# Patient Record
Sex: FEMALE | Race: BLACK OR AFRICAN AMERICAN | NOT HISPANIC OR LATINO | URBAN - METROPOLITAN AREA
[De-identification: names, ages, dates, MRNs, and addresses within clinical notes are randomized per-mention and may not be internally consistent; named-entity substitution may affect disease eponyms.]

---

## 2020-07-16 ENCOUNTER — EMERGENCY (EMERGENCY)
Facility: HOSPITAL | Age: 25
LOS: 0 days | Discharge: HOME | End: 2020-07-16
Attending: EMERGENCY MEDICINE
Payer: COMMERCIAL

## 2020-07-16 VITALS
OXYGEN SATURATION: 98 % | TEMPERATURE: 99 F | DIASTOLIC BLOOD PRESSURE: 61 MMHG | SYSTOLIC BLOOD PRESSURE: 104 MMHG | RESPIRATION RATE: 17 BRPM | HEART RATE: 64 BPM

## 2020-07-16 VITALS — HEIGHT: 67 IN | WEIGHT: 162.92 LBS

## 2020-07-16 DIAGNOSIS — R10.30 LOWER ABDOMINAL PAIN, UNSPECIFIED: ICD-10-CM

## 2020-07-16 DIAGNOSIS — R55 SYNCOPE AND COLLAPSE: ICD-10-CM

## 2020-07-16 DIAGNOSIS — N70.11 CHRONIC SALPINGITIS: ICD-10-CM

## 2020-07-16 LAB
ALBUMIN SERPL ELPH-MCNC: 3.9 G/DL — SIGNIFICANT CHANGE UP (ref 3.3–5)
ALP SERPL-CCNC: 62 U/L — SIGNIFICANT CHANGE UP (ref 40–120)
ALT FLD-CCNC: 18 U/L — SIGNIFICANT CHANGE UP (ref 12–78)
ANION GAP SERPL CALC-SCNC: 6 MMOL/L — SIGNIFICANT CHANGE UP (ref 5–17)
APPEARANCE UR: CLEAR — SIGNIFICANT CHANGE UP
AST SERPL-CCNC: 9 U/L — LOW (ref 15–37)
BACTERIA # UR AUTO: NEGATIVE — SIGNIFICANT CHANGE UP
BASOPHILS # BLD AUTO: 0.05 K/UL — SIGNIFICANT CHANGE UP (ref 0–0.2)
BASOPHILS NFR BLD AUTO: 0.6 % — SIGNIFICANT CHANGE UP (ref 0–2)
BILIRUB SERPL-MCNC: 1.2 MG/DL — SIGNIFICANT CHANGE UP (ref 0.2–1.2)
BILIRUB UR-MCNC: NEGATIVE — SIGNIFICANT CHANGE UP
BUN SERPL-MCNC: 10 MG/DL — SIGNIFICANT CHANGE UP (ref 7–23)
CALCIUM SERPL-MCNC: 8.7 MG/DL — SIGNIFICANT CHANGE UP (ref 8.5–10.1)
CHLORIDE SERPL-SCNC: 107 MMOL/L — SIGNIFICANT CHANGE UP (ref 96–108)
CO2 SERPL-SCNC: 26 MMOL/L — SIGNIFICANT CHANGE UP (ref 22–31)
COLOR SPEC: YELLOW — SIGNIFICANT CHANGE UP
COMMENT - URINE: SIGNIFICANT CHANGE UP
CREAT SERPL-MCNC: 0.77 MG/DL — SIGNIFICANT CHANGE UP (ref 0.5–1.3)
DIFF PNL FLD: ABNORMAL
EOSINOPHIL # BLD AUTO: 0.09 K/UL — SIGNIFICANT CHANGE UP (ref 0–0.5)
EOSINOPHIL NFR BLD AUTO: 1.1 % — SIGNIFICANT CHANGE UP (ref 0–6)
EPI CELLS # UR: SIGNIFICANT CHANGE UP
GLUCOSE SERPL-MCNC: 80 MG/DL — SIGNIFICANT CHANGE UP (ref 70–99)
GLUCOSE UR QL: NEGATIVE MG/DL — SIGNIFICANT CHANGE UP
HCT VFR BLD CALC: 38.3 % — SIGNIFICANT CHANGE UP (ref 34.5–45)
HGB BLD-MCNC: 12.7 G/DL — SIGNIFICANT CHANGE UP (ref 11.5–15.5)
IMM GRANULOCYTES NFR BLD AUTO: 0.4 % — SIGNIFICANT CHANGE UP (ref 0–1.5)
KETONES UR-MCNC: ABNORMAL
LEUKOCYTE ESTERASE UR-ACNC: NEGATIVE — SIGNIFICANT CHANGE UP
LYMPHOCYTES # BLD AUTO: 1.79 K/UL — SIGNIFICANT CHANGE UP (ref 1–3.3)
LYMPHOCYTES # BLD AUTO: 21.1 % — SIGNIFICANT CHANGE UP (ref 13–44)
MCHC RBC-ENTMCNC: 31.4 PG — SIGNIFICANT CHANGE UP (ref 27–34)
MCHC RBC-ENTMCNC: 33.2 GM/DL — SIGNIFICANT CHANGE UP (ref 32–36)
MCV RBC AUTO: 94.6 FL — SIGNIFICANT CHANGE UP (ref 80–100)
MONOCYTES # BLD AUTO: 0.49 K/UL — SIGNIFICANT CHANGE UP (ref 0–0.9)
MONOCYTES NFR BLD AUTO: 5.8 % — SIGNIFICANT CHANGE UP (ref 2–14)
NEUTROPHILS # BLD AUTO: 6.04 K/UL — SIGNIFICANT CHANGE UP (ref 1.8–7.4)
NEUTROPHILS NFR BLD AUTO: 71 % — SIGNIFICANT CHANGE UP (ref 43–77)
NITRITE UR-MCNC: NEGATIVE — SIGNIFICANT CHANGE UP
PH UR: 5 — SIGNIFICANT CHANGE UP (ref 5–8)
PLATELET # BLD AUTO: 281 K/UL — SIGNIFICANT CHANGE UP (ref 150–400)
POTASSIUM SERPL-MCNC: 4 MMOL/L — SIGNIFICANT CHANGE UP (ref 3.5–5.3)
POTASSIUM SERPL-SCNC: 4 MMOL/L — SIGNIFICANT CHANGE UP (ref 3.5–5.3)
PROT SERPL-MCNC: 7.2 GM/DL — SIGNIFICANT CHANGE UP (ref 6–8.3)
PROT UR-MCNC: 15 MG/DL
RBC # BLD: 4.05 M/UL — SIGNIFICANT CHANGE UP (ref 3.8–5.2)
RBC # FLD: 11.5 % — SIGNIFICANT CHANGE UP (ref 10.3–14.5)
RBC CASTS # UR COMP ASSIST: ABNORMAL /HPF (ref 0–4)
SODIUM SERPL-SCNC: 139 MMOL/L — SIGNIFICANT CHANGE UP (ref 135–145)
SP GR SPEC: 1.02 — SIGNIFICANT CHANGE UP (ref 1.01–1.02)
UROBILINOGEN FLD QL: NEGATIVE MG/DL — SIGNIFICANT CHANGE UP
WBC # BLD: 8.49 K/UL — SIGNIFICANT CHANGE UP (ref 3.8–10.5)
WBC # FLD AUTO: 8.49 K/UL — SIGNIFICANT CHANGE UP (ref 3.8–10.5)
WBC UR QL: SIGNIFICANT CHANGE UP

## 2020-07-16 PROCEDURE — 96374 THER/PROPH/DIAG INJ IV PUSH: CPT

## 2020-07-16 PROCEDURE — 99284 EMERGENCY DEPT VISIT MOD MDM: CPT

## 2020-07-16 PROCEDURE — 93010 ELECTROCARDIOGRAM REPORT: CPT

## 2020-07-16 PROCEDURE — 85025 COMPLETE CBC W/AUTO DIFF WBC: CPT

## 2020-07-16 PROCEDURE — 76856 US EXAM PELVIC COMPLETE: CPT | Mod: 26

## 2020-07-16 PROCEDURE — 80053 COMPREHEN METABOLIC PANEL: CPT

## 2020-07-16 PROCEDURE — 99284 EMERGENCY DEPT VISIT MOD MDM: CPT | Mod: 25

## 2020-07-16 PROCEDURE — 81001 URINALYSIS AUTO W/SCOPE: CPT

## 2020-07-16 PROCEDURE — 36415 COLL VENOUS BLD VENIPUNCTURE: CPT

## 2020-07-16 PROCEDURE — 81025 URINE PREGNANCY TEST: CPT

## 2020-07-16 PROCEDURE — 93005 ELECTROCARDIOGRAM TRACING: CPT

## 2020-07-16 PROCEDURE — 76856 US EXAM PELVIC COMPLETE: CPT

## 2020-07-16 RX ORDER — KETOROLAC TROMETHAMINE 30 MG/ML
15 SYRINGE (ML) INJECTION ONCE
Refills: 0 | Status: DISCONTINUED | OUTPATIENT
Start: 2020-07-16 | End: 2020-07-16

## 2020-07-16 RX ORDER — SODIUM CHLORIDE 9 MG/ML
1000 INJECTION INTRAMUSCULAR; INTRAVENOUS; SUBCUTANEOUS ONCE
Refills: 0 | Status: COMPLETED | OUTPATIENT
Start: 2020-07-16 | End: 2020-07-16

## 2020-07-16 RX ADMIN — Medication 15 MILLIGRAM(S): at 13:06

## 2020-07-16 RX ADMIN — SODIUM CHLORIDE 1000 MILLILITER(S): 9 INJECTION INTRAMUSCULAR; INTRAVENOUS; SUBCUTANEOUS at 10:27

## 2020-07-16 NOTE — ED ADULT NURSE REASSESSMENT NOTE - NS ED NURSE REASSESS COMMENT FT1
Received care of patient from Maria L LOZA patient using restroom, will place IV and draw blood work as ordered

## 2020-07-16 NOTE — ED PROVIDER NOTE - PATIENT PORTAL LINK FT
You can access the FollowMyHealth Patient Portal offered by [facility] by registering at the following website: [email or web address]. By joining [employer]’s FollowMyHealth portal, you will also be able to view your health information using other applications (apps) compatible with our system.

## 2020-07-16 NOTE — ED PROVIDER NOTE - NSFOLLOWUPINSTRUCTIONS_ED_ALL_ED_FT
Syncope    WHAT YOU NEED TO KNOW:    Syncope is also called fainting or passing out. Syncope is a sudden, temporary loss of consciousness, followed by a fall from a standing or sitting position. Syncope ranges from not serious to a sign of a more serious condition that needs to be treated. You can control some health conditions that cause syncope. Your healthcare providers can help you create a plan to manage syncope and prevent episodes.    DISCHARGE INSTRUCTIONS:    Seek care immediately if:     You are bleeding because you hit your head when you fainted.       You suddenly have double vision, difficulty speaking, numbness, and cannot move your arms or legs.      You have chest pain and trouble breathing.      You vomit blood or material that looks like coffee grounds.      You see blood in your bowel movement.    Contact your healthcare provider if:     You have new or worsening symptoms.      You have another syncope episode.      You have a headache, fast heartbeat, or feel too dizzy to stand up.      You have questions or concerns about your condition or care.    Medicines:     Medicines may be needed to help your heart pump strongly and regularly. Your healthcare provider may also make changes to any medicines that are causing syncope.       Take your medicine as directed. Contact your healthcare provider if you think your medicine is not helping or if you have side effects. Tell him or her if you are allergic to any medicine. Keep a list of the medicines, vitamins, and herbs you take. Include the amounts, and when and why you take them. Bring the list or the pill bottles to follow-up visits. Carry your medicine list with you in case of an emergency.    Follow up with your healthcare provider as directed: Write down your questions so you remember to ask them during your visits.     Manage syncope:     Keep a record of your syncope episodes. Include your symptoms and your activity before and after the episode. The record can help your healthcare provider find the cause of your syncope and help you manage episodes.      Sit or lie down when needed. This includes when you feel dizzy, your throat is getting tight, and your vision changes. Raise your legs above the level of your heart.      Take slow, deep breaths if you start to breathe faster with anxiety or fear. This can help decrease dizziness and the feeling that you might faint.       Check your blood pressure often. This is important if you take medicine to lower your blood pressure. Check your blood pressure when you are lying down and when you are standing. Ask how often to check during the day. Keep a record of your blood pressure numbers. Your healthcare provider may use the record to help plan your treatment.How to take a Blood Pressure         Prevent a syncope episode:     Move slowly and let yourself get used to one position before you move to another position. This is very important when you change from a lying or sitting position to a standing position. Take some deep breaths before you stand up from a lying position. Stand up slowly. Sudden movements may cause a fainting spell. Sit on the side of the bed or couch for a few minutes before you stand up. If you are on bedrest, try to be upright for about 2 hours each day, or as directed. Do not lock your legs if you are standing for a long period of time. Move your legs and bend your knees to keep blood flowing.      Follow your healthcare provider's recommendations. Your provider may recommend that you drink more liquids to prevent dehydration. You may also need to have more salt to keep your blood pressure from dropping too low and causing syncope. Your provider will tell you how much liquid and sodium to have each day. He or she will also tell you how much physical activity is safe for you. This will depend on what is causing your syncope.      Watch for signs of low blood sugar. These include hunger, nervousness, sweating, and fast or fluttery heartbeats. Talk with your healthcare provider about ways to keep your blood sugar level steady.      Do not strain if you are constipated. You may faint if you strain to have a bowel movement. Walking is the best way to get your bowels moving. Eat foods high in fiber to make it easier to have a bowel movement. Good examples are high-fiber cereals, beans, vegetables, and whole-grain breads. Prune juice may help make bowel movements softer.      Be careful in hot weather. Heat can cause a syncope episode. Limit activity done outside on hot days. Physical activity in hot weather can lead to dehydration. This can cause an episode.  Acute Abdominal Pain    WHAT YOU NEED TO KNOW:    The cause of your abdominal pain may not be found. If a cause is found, treatment will depend on what the cause is.     DISCHARGE INSTRUCTIONS:    Return to the emergency department if:     You vomit blood or cannot stop vomiting.      You have blood in your bowel movement or it looks like tar.       You have bleeding from your rectum.       Your abdomen is larger than usual, more painful, and hard.       You have severe pain in your abdomen.       You stop passing gas and having bowel movements.       You feel weak, dizzy, or faint.    Contact your healthcare provider if:     You have a fever.      You have new signs and symptoms.      Your symptoms do not get better with treatment.       You have questions or concerns about your condition or care.    Medicines may be given to decrease pain, treat an infection, and manage your symptoms. Take your medicine as directed. Call your healthcare provider if you think your medicine is not helping or if you have side effects. Tell him if you are allergic to any medicine. Keep a list of the medicines, vitamins, and herbs you take. Include the amounts, and when and why you take them. Bring the list or the pill bottles to follow-up visits. Carry your medicine list with you in case of an emergency.    Manage your symptoms:     Apply heat on your abdomen for 20 to 30 minutes every 2 hours for as many days as directed. Heat helps decrease pain and muscle spasms.       Manage your stress. Stress may cause abdominal pain. Your healthcare provider may recommend relaxation techniques and deep breathing exercises to help decrease your stress. Your healthcare provider may recommend you talk to someone about your stress or anxiety, such as a counselor or a trusted friend. Get plenty of sleep and exercise regularly.       Limit or do not drink alcohol. Alcohol can make your abdominal pain worse. Ask your healthcare provider if it is safe for you to drink alcohol. Also ask how much is safe for you to drink.       Do not smoke. Nicotine and other chemicals in cigarettes can damage your esophagus and stomach. Ask your healthcare provider for information if you currently smoke and need help to quit. E-cigarettes or smokeless tobacco still contain nicotine. Talk to your healthcare provider before you use these products.     Make changes to the food you eat as directed: Do not eat foods that cause abdominal pain or other symptoms. Eat small meals more often.     Eat more high-fiber foods if you are constipated. High-fiber foods include fruits, vegetables, whole-grain foods, and legumes.       Do not eat foods that cause gas if you have bloating. Examples include broccoli, cabbage, and cauliflower. Do not drink soda or carbonated drinks, because these may also cause gas.       Do not eat foods or drinks that contain sorbitol or fructose if you have diarrhea and bloating. Some examples are fruit juices, candy, jelly, and sugar-free gum.       Do not eat high-fat foods, such as fried foods, cheeseburgers, hot dogs, and desserts.      Limit or do not drink caffeine. Caffeine may make symptoms, such as heart burn or nausea, worse.       Drink plenty of liquids to prevent dehydration from diarrhea or vomiting. Ask your healthcare provider how much liquid to drink each day and which liquids are best for you.     Follow up with your healthcare provider as directed: Write down your questions so you remember to ask them during your visits.

## 2020-07-16 NOTE — ED PROVIDER NOTE - MUSCULOSKELETAL, MLM
Spine appears normal, range of motion is not limited, no muscle or joint tenderness. +mild tend diffusely to lower pelvic region.

## 2020-07-16 NOTE — ED PROVIDER NOTE - CLINICAL SUMMARY MEDICAL DECISION MAKING FREE TEXT BOX
will check labs, no red flags for syncope. will check pelvic ultrasound for cysts dispo. pending labs. will check labs, no red flags for syncope. will check pelvic ultrasound for cysts dispo pending labs.

## 2020-07-16 NOTE — CONSULT NOTE ADULT - ASSESSMENT
A/P  Possible muscular back pain in combination to R hydrosalpinx cause of pain  Patient advised to follow-up outpatient   No further intervention necessary at present time from OBGYN prospective  Patient encouraged to continue current  pain management regimen

## 2020-07-16 NOTE — CONSULT NOTE ADULT - SUBJECTIVE AND OBJECTIVE BOX
LATESHA BEAVERS  25y  Female [id number]    HPI: Patient is a 25 y.o  Female presenting to ED for a syncopal event while defecating diarrhea and on her menstrual period. Patient reports to heavy cramping during menstruation but feels this pain is different, referring to back pain. Pain improves with Advil. Patient reports lack of medical care due to fear of doctors. Denies PMHx including STIs but claims psych hx of bipolar and depression.     REVIEW OF SYSTEMS:  CONSTITUTIONAL: No weakness, fevers or chills  EYES/ENT: No visual changes;  No vertigo or throat pain   NECK: No pain or stiffness  RESPIRATORY: No cough, wheezing, hemoptysis; No shortness of breath  CARDIOVASCULAR: No chest pain or palpitations  GASTROINTESTINAL: No abdominal or epigastric pain. No nausea, vomiting, or hematemesis; No diarrhea or constipation. No melena or hematochezia.  GENITOURINARY: No dysuria, frequency or hematuria  NEUROLOGICAL: No numbness or weakness  SKIN: No itching, burning, rashes, or lesions   All other review of systems is negative unless indicated above      No Known Allergies      PAST MEDICAL & SURGICAL HISTORY: Denies                                          Vital Signs Last 24 Hrs  T(C): 37.2 (2020 09:44), Max: 37.2 (2020 09:44)  T(F): 99 (2020 09:44), Max: 99 (2020 09:44)  HR: 63 (2020 09:44) (63 - 63)  BP: 98/67 (2020 09:44) (98/67 - 98/67)  BP(mean): --  RR: 18 (2020 09:44) (18 - 18)  SpO2: 95% (2020 09:44) (95% - 95%)    Last Menstrual Period x 2 days      PHYSICAL EXAM:  Constitutional: NAD, awake and alert, well-developed  Gastrointestinal:  tender to palpation, Bowel Sounds present, soft,, nondistended, no guarding, no rebound      LABS:  Pregnancy Test:                        12.7   8.49  )-----------( 281      ( 2020 10:00 )             38.3     07-16    139  |  107  |  10  ----------------------------<  80  4.0   |  26  |  0.77    Ca    8.7      2020 10:00    TPro  7.2  /  Alb  3.9  /  TBili  1.2  /  DBili  x   /  AST  9<L>  /  ALT  18  /  AlkPhos  62  07-16    I&O's Detail      Urinalysis Basic - ( 2020 10:14 )    Color: Yellow / Appearance: Clear / S.020 / pH: x  Gluc: x / Ketone: Moderate  / Bili: Negative / Urobili: Negative mg/dL   Blood: x / Protein: 15 mg/dL / Nitrite: Negative   Leuk Esterase: Negative / RBC: 6-10 /HPF / WBC 0-2   Sq Epi: x / Non Sq Epi: Occasional / Bacteria: Negative        RADIOLOGY & ADDITIONAL STUDIES:

## 2020-07-16 NOTE — ED ADULT TRIAGE NOTE - CHIEF COMPLAINT QUOTE
pt ambulatory to ED for eval of near syncope last night while having bowel movement w/ increasing ABD pain. pt notes diarrhea and menstrual cycle.

## 2020-07-16 NOTE — ED PROVIDER NOTE - PROGRESS NOTE DETAILS
US findings noted. Discussed with ob/gyn who evaluated patient.  Well appearing and not sexually active since January.  Not suspicious for PID.  Pt well appearing.  No e/o abd pain on exam, appears to be more pelvic.  Discussed remote possibility of appy.  Pt agrees with plan for watchful waiting.  Well appearing on reassessment.  No e/o arrythmia.  Story not suspicious for dangerous etiology of syncope.  Stable for outpatient f/u.  D/c home with strict return precautions and prompt outpatient f/u.

## 2020-07-16 NOTE — ED PROVIDER NOTE - CARE PROVIDER_API CALL
Suraj Gomez)  Obstetrics and Gynecology  [address] [place] [address]  Phone: [phone]  Fax: [phone]  Follow Up Time: 1-3 Days

## 2020-07-16 NOTE — ED PROVIDER NOTE - OBJECTIVE STATEMENT
26 y/o female with no significant pmhx presents to the ED c/o syncope. Pt came to the ED for eval due to a syncopal episode while having a BM with increasing abd pain last night. Pt notes that she is currently was on the toilet having menstrual bleeding. Pt states she started feeling light headed. lowered her self to the floor. Believes she was out for about 1 min. She is having complaints of pelvic pain, did just start her period but states its worse than previous. other than pelvis pain, she has no other complaints at this time.

## 2020-07-27 ENCOUNTER — INPATIENT (INPATIENT)
Facility: HOSPITAL | Age: 25
LOS: 7 days | Discharge: HOME | DRG: 885 | End: 2020-08-04
Attending: PSYCHIATRY & NEUROLOGY | Admitting: PSYCHIATRY & NEUROLOGY
Payer: COMMERCIAL

## 2020-07-27 VITALS — WEIGHT: 160.94 LBS | HEIGHT: 67 IN

## 2020-07-27 DIAGNOSIS — F31.62 BIPOLAR DISORDER, CURRENT EPISODE MIXED, MODERATE: ICD-10-CM

## 2020-07-27 DIAGNOSIS — F12.90 CANNABIS USE, UNSPECIFIED, UNCOMPLICATED: ICD-10-CM

## 2020-07-27 DIAGNOSIS — F41.1 GENERALIZED ANXIETY DISORDER: ICD-10-CM

## 2020-07-27 LAB
ALBUMIN SERPL ELPH-MCNC: 3.6 G/DL — SIGNIFICANT CHANGE UP (ref 3.3–5)
ALP SERPL-CCNC: 58 U/L — SIGNIFICANT CHANGE UP (ref 40–120)
ALT FLD-CCNC: 19 U/L — SIGNIFICANT CHANGE UP (ref 12–78)
ANION GAP SERPL CALC-SCNC: 5 MMOL/L — SIGNIFICANT CHANGE UP (ref 5–17)
APAP SERPL-MCNC: < 2 UG/ML (ref 10–30)
APPEARANCE UR: ABNORMAL
AST SERPL-CCNC: 9 U/L — LOW (ref 15–37)
BASOPHILS # BLD AUTO: 0.08 K/UL — SIGNIFICANT CHANGE UP (ref 0–0.2)
BASOPHILS NFR BLD AUTO: 1.2 % — SIGNIFICANT CHANGE UP (ref 0–2)
BILIRUB SERPL-MCNC: 0.7 MG/DL — SIGNIFICANT CHANGE UP (ref 0.2–1.2)
BILIRUB UR-MCNC: NEGATIVE — SIGNIFICANT CHANGE UP
BUN SERPL-MCNC: 9 MG/DL — SIGNIFICANT CHANGE UP (ref 7–23)
CALCIUM SERPL-MCNC: 8.7 MG/DL — SIGNIFICANT CHANGE UP (ref 8.5–10.1)
CHLORIDE SERPL-SCNC: 109 MMOL/L — HIGH (ref 96–108)
CO2 SERPL-SCNC: 28 MMOL/L — SIGNIFICANT CHANGE UP (ref 22–31)
COLOR SPEC: YELLOW — SIGNIFICANT CHANGE UP
CREAT SERPL-MCNC: 0.8 MG/DL — SIGNIFICANT CHANGE UP (ref 0.5–1.3)
DIFF PNL FLD: ABNORMAL
EOSINOPHIL # BLD AUTO: 0.09 K/UL — SIGNIFICANT CHANGE UP (ref 0–0.5)
EOSINOPHIL NFR BLD AUTO: 1.4 % — SIGNIFICANT CHANGE UP (ref 0–6)
ETHANOL SERPL-MCNC: <10 MG/DL — SIGNIFICANT CHANGE UP (ref 0–10)
GLUCOSE SERPL-MCNC: 85 MG/DL — SIGNIFICANT CHANGE UP (ref 70–99)
GLUCOSE UR QL: NEGATIVE MG/DL — SIGNIFICANT CHANGE UP
HCT VFR BLD CALC: 37.9 % — SIGNIFICANT CHANGE UP (ref 34.5–45)
HGB BLD-MCNC: 12.6 G/DL — SIGNIFICANT CHANGE UP (ref 11.5–15.5)
IMM GRANULOCYTES NFR BLD AUTO: 0 % — SIGNIFICANT CHANGE UP (ref 0–1.5)
KETONES UR-MCNC: ABNORMAL
LEUKOCYTE ESTERASE UR-ACNC: ABNORMAL
LYMPHOCYTES # BLD AUTO: 1.87 K/UL — SIGNIFICANT CHANGE UP (ref 1–3.3)
LYMPHOCYTES # BLD AUTO: 28.8 % — SIGNIFICANT CHANGE UP (ref 13–44)
MCHC RBC-ENTMCNC: 31.3 PG — SIGNIFICANT CHANGE UP (ref 27–34)
MCHC RBC-ENTMCNC: 33.2 GM/DL — SIGNIFICANT CHANGE UP (ref 32–36)
MCV RBC AUTO: 94.3 FL — SIGNIFICANT CHANGE UP (ref 80–100)
MONOCYTES # BLD AUTO: 0.38 K/UL — SIGNIFICANT CHANGE UP (ref 0–0.9)
MONOCYTES NFR BLD AUTO: 5.8 % — SIGNIFICANT CHANGE UP (ref 2–14)
NEUTROPHILS # BLD AUTO: 4.08 K/UL — SIGNIFICANT CHANGE UP (ref 1.8–7.4)
NEUTROPHILS NFR BLD AUTO: 62.8 % — SIGNIFICANT CHANGE UP (ref 43–77)
NITRITE UR-MCNC: NEGATIVE — SIGNIFICANT CHANGE UP
PCP SPEC-MCNC: SIGNIFICANT CHANGE UP
PH UR: 5 — SIGNIFICANT CHANGE UP (ref 5–8)
PLATELET # BLD AUTO: 318 K/UL — SIGNIFICANT CHANGE UP (ref 150–400)
POTASSIUM SERPL-MCNC: 4 MMOL/L — SIGNIFICANT CHANGE UP (ref 3.5–5.3)
POTASSIUM SERPL-SCNC: 4 MMOL/L — SIGNIFICANT CHANGE UP (ref 3.5–5.3)
PROT SERPL-MCNC: 6.8 GM/DL — SIGNIFICANT CHANGE UP (ref 6–8.3)
PROT UR-MCNC: 15 MG/DL
RBC # BLD: 4.02 M/UL — SIGNIFICANT CHANGE UP (ref 3.8–5.2)
RBC # FLD: 11.5 % — SIGNIFICANT CHANGE UP (ref 10.3–14.5)
SALICYLATES SERPL-MCNC: <1.7 MG/DL (ref 2.8–20)
SARS-COV-2 RNA SPEC QL NAA+PROBE: SIGNIFICANT CHANGE UP
SODIUM SERPL-SCNC: 142 MMOL/L — SIGNIFICANT CHANGE UP (ref 135–145)
SP GR SPEC: 1.02 — SIGNIFICANT CHANGE UP (ref 1.01–1.02)
UROBILINOGEN FLD QL: 4 MG/DL
WBC # BLD: 6.5 K/UL — SIGNIFICANT CHANGE UP (ref 3.8–10.5)
WBC # FLD AUTO: 6.5 K/UL — SIGNIFICANT CHANGE UP (ref 3.8–10.5)

## 2020-07-27 RX ORDER — QUETIAPINE FUMARATE 200 MG/1
50 TABLET, FILM COATED ORAL AT BEDTIME
Refills: 0 | Status: DISCONTINUED | OUTPATIENT
Start: 2020-07-27 | End: 2020-07-28

## 2020-07-27 RX ORDER — CLONAZEPAM 1 MG
0.5 TABLET ORAL ONCE
Refills: 0 | Status: DISCONTINUED | OUTPATIENT
Start: 2020-07-27 | End: 2020-07-27

## 2020-07-27 RX ORDER — LITHIUM CARBONATE 300 MG/1
300 TABLET, EXTENDED RELEASE ORAL AT BEDTIME
Refills: 0 | Status: DISCONTINUED | OUTPATIENT
Start: 2020-07-27 | End: 2020-07-30

## 2020-07-27 RX ADMIN — Medication 20 MILLIGRAM(S): at 15:40

## 2020-07-27 RX ADMIN — Medication 0.5 MILLIGRAM(S): at 17:30

## 2020-07-27 RX ADMIN — LITHIUM CARBONATE 300 MILLIGRAM(S): 300 TABLET, EXTENDED RELEASE ORAL at 22:17

## 2020-07-27 NOTE — ED BEHAVIORAL HEALTH ASSESSMENT NOTE - DETAILS
history of suicidal ideation: 7.17.2020 with no plan or intent Celexa - severe suicidal ideation self-referred ED Attending / Telepsychiatry

## 2020-07-27 NOTE — ED BEHAVIORAL HEALTH ASSESSMENT NOTE - SUMMARY
Patient is a 25 year-old  female, with history of Bipolar, Depression, Anxiety, with history of in-patient hospitalization age 17 at [facility] for manic depressive symptoms, with prior self-injurious behaviors but no suicide attempt, self-referred and presents with manic depressive symptoms.    Patient presenting with Bipolar, mixed. Patient has passive suicidal ideation. Patient requesting voluntary admission of which she warrants and can benefit from.    PLAN  1. Constant Observation   2. Boarding for bed at  on 5[facility] ([facility])  3. Jud 300 mg at bedtime.   4. Klonopin 0.5 mg daily for Anxiety  5. Seroquel 50 mg at bedtime for Manic-insomnia

## 2020-07-27 NOTE — ED ADULT TRIAGE NOTE - CHIEF COMPLAINT QUOTE
pt presents for manic episodes x few weeks , agitation , insomnia and excessive sleeping , SI no active plan , unmedicated for 4 years, was on lithium and gabapentin

## 2020-07-27 NOTE — ED BEHAVIORAL HEALTH ASSESSMENT NOTE - DESCRIPTION
Patient was calm and cooperative in the ED and did not exhibit any aggression. Patient did not require any PRN medications or any physical restraints. As per HPI

## 2020-07-27 NOTE — ED PROVIDER NOTE - CLINICAL SUMMARY MEDICAL DECISION MAKING FREE TEXT BOX
26 y/o female presenting with insomnia, savannah, depression and suicidal thoughts. plan: labs, psych consult, reeval .

## 2020-07-27 NOTE — ED PROVIDER NOTE - PROGRESS NOTE DETAILS
No active SI.  Has had thoughts of self-harm in past, but not presently CC:  Informed by Tele-Psychiatry that a  [facility] psych bed has opened up for pt.  Will have pt sign Voluntary Psych Admission form & fax documents & EKG back to tele-Psychiatry to finalize admission. Jayme CHUNG: Received s/o from night team; patient to be admitted to [facility].

## 2020-07-27 NOTE — ED ADULT NURSE NOTE - OBJECTIVE STATEMENT
Pt comes to ED today for evaluation and treatment of her "manic episodes". Pt states that she suffers from anxiety and depression as well as bipolar disorder. Pt is not currently medicated. Pt states that she has not been medicated in 4 years, that when she left for college she was able to wean herself off of the medications.

## 2020-07-27 NOTE — ED BEHAVIORAL HEALTH ASSESSMENT NOTE - ADDITIONAL DETAILS ALL
chronic history of self-injurious behaviors; history of passive suicidal ideation; no prior suicide attempt

## 2020-07-27 NOTE — ED PROVIDER NOTE - OBJECTIVE STATEMENT
24 y/o female with pmhx of bipolar disorder, depression, anxiety presents to the ED for psychiatric eval. Pt has been having manic episodes for the past few weeks. +agitation +insomnia +excessive sleeping +SI no active plan +self harm. Pt was unmedicated for 4 years but took Lithium and Gabapentin in the past. lived in [place] for school an saw a therapist, her therapist told her to move home and get her life together. when she got home she pushed off seeing a therapist due to covid and states that now she feels worse due to quarantine.

## 2020-07-27 NOTE — ED BEHAVIORAL HEALTH ASSESSMENT NOTE - RISK ASSESSMENT
MOD RISK    ACUTE RISK FACTORS: manic, elevated mood, insomnia, depressed mood, anhedonia, passive suicidal ideation, self-injurious behaviors     CHRONIC RISK FACTORS: Bipolar, depression, anxiety, history of self-injurious behaviors     PROTECTIVE FACTORS: no active suicidal ideation/intent/plan, motivation for psychiatric treatment    MITIGATION STRATEGIES: medication management, in-patient hospitalization,     NON-MODIFIABLE RISK FACTORS: history of in-patient hospitalizations    MODIFIABLE RISK FACTORS: depressed mood, manic symptoms, insomnia, passive suicidal ideation Moderate Acute Suicide Risk

## 2020-07-27 NOTE — ED BEHAVIORAL HEALTH NOTE - BEHAVIORAL HEALTH NOTE
[facility] has female beds at this time; however, per NP Goba pt is voluntary status and wants admission to . Per NP Goba, pt to remain in the ED overnight pending admission to . SW to follow up tomorrow as needed.

## 2020-07-27 NOTE — ED BEHAVIORAL HEALTH ASSESSMENT NOTE - HPI (INCLUDE ILLNESS QUALITY, SEVERITY, DURATION, TIMING, CONTEXT, MODIFYING FACTORS, ASSOCIATED SIGNS AND SYMPTOMS)
Patient is a 25 year-old  female, with history of Bipolar, Depression, Anxiety, with history of in-patient hospitalization age 17 at [facility] for manic depressive symptoms, with prior self-injurious behaviors but no suicide attempt, self-referred and presents with manic depressive symptoms.    Patient reports history of psychotropic management from age 17 but was on "too many medications" and so she stopped before going to college. Reports recently feeling like she has been manic and depressed. Reports periods of elevated mood, inflated self esteem, high energy, racing thoughts, insomnia, high appetite, distractibility, and then goes into severe depressed mood, crying, fatigue, low appetite. Reports feeling unstable. Reports last suicidal ideation and self-injurious behaviors being last week. Reports current passive suicidal ideation of wanting "that bad part of her to be dead." Reports chronic anxiety Denies psychotic symptoms. Reports daily marijuana use as means of self medicating. Denies other substance abuse. Reports wanting to restart Lithium however a lower dosage secondary to history of feeling dehydrated on Lithium. Reports liking Wellbutrin for Anxiety (150 - 300 mg). Reports wanting simple psychotropic management as she has a history of being on too many medications at once.

## 2020-07-27 NOTE — ED BEHAVIORAL HEALTH ASSESSMENT NOTE - PRIMARY DX
Bipolar disorder, current episode mixed, moderate Bipolar affective disorder, current episode hypomanic

## 2020-07-28 DIAGNOSIS — F31.9 BIPOLAR DISORDER, UNSPECIFIED: ICD-10-CM

## 2020-07-28 DIAGNOSIS — F31.0 BIPOLAR DISORDER, CURRENT EPISODE HYPOMANIC: ICD-10-CM

## 2020-07-28 LAB — HCG SERPL-ACNC: <1 MIU/ML — SIGNIFICANT CHANGE UP

## 2020-07-28 PROCEDURE — 99222 1ST HOSP IP/OBS MODERATE 55: CPT

## 2020-07-28 PROCEDURE — 80178 ASSAY OF LITHIUM: CPT

## 2020-07-28 PROCEDURE — 84443 ASSAY THYROID STIM HORMONE: CPT

## 2020-07-28 PROCEDURE — 83036 HEMOGLOBIN GLYCOSYLATED A1C: CPT

## 2020-07-28 PROCEDURE — 85025 COMPLETE CBC W/AUTO DIFF WBC: CPT

## 2020-07-28 PROCEDURE — 74177 CT ABD & PELVIS W/CONTRAST: CPT

## 2020-07-28 PROCEDURE — 80053 COMPREHEN METABOLIC PANEL: CPT

## 2020-07-28 PROCEDURE — 36415 COLL VENOUS BLD VENIPUNCTURE: CPT

## 2020-07-28 PROCEDURE — 84702 CHORIONIC GONADOTROPIN TEST: CPT

## 2020-07-28 PROCEDURE — 86769 SARS-COV-2 COVID-19 ANTIBODY: CPT

## 2020-07-28 PROCEDURE — 80061 LIPID PANEL: CPT

## 2020-07-28 RX ORDER — LITHIUM CARBONATE 300 MG/1
300 TABLET, EXTENDED RELEASE ORAL DAILY
Refills: 0 | Status: DISCONTINUED | OUTPATIENT
Start: 2020-07-28 | End: 2020-07-30

## 2020-07-28 RX ORDER — QUETIAPINE FUMARATE 200 MG/1
50 TABLET, FILM COATED ORAL AT BEDTIME
Refills: 0 | Status: DISCONTINUED | OUTPATIENT
Start: 2020-07-28 | End: 2020-08-01

## 2020-07-28 RX ADMIN — LITHIUM CARBONATE 300 MILLIGRAM(S): 300 TABLET, EXTENDED RELEASE ORAL at 11:56

## 2020-07-28 RX ADMIN — Medication 1 MILLIGRAM(S): at 14:25

## 2020-07-28 RX ADMIN — QUETIAPINE FUMARATE 50 MILLIGRAM(S): 200 TABLET, FILM COATED ORAL at 21:01

## 2020-07-28 RX ADMIN — LITHIUM CARBONATE 300 MILLIGRAM(S): 300 TABLET, EXTENDED RELEASE ORAL at 21:01

## 2020-07-28 NOTE — H&P ADULT - NSHPPHYSICALEXAM_GEN_ALL_CORE
Vital Signs   T(F): 97.4 (28 Jul 2020 13:03), Max: 98.5 (28 Jul 2020 12:05)  HR: 75 (28 Jul 2020 12:05) (75 - 91)  BP: 106/64 (28 Jul 2020 12:05) (106/64 - 121/76)  BP(mean): 84 (28 Jul 2020 06:45) (84 - 84)  RR: 14 (28 Jul 2020 13:03) (14 - 18)  SpO2: 99% (28 Jul 2020 13:03) (99% - 100%)

## 2020-07-28 NOTE — ED BEHAVIORAL HEALTH NOTE - BEHAVIORAL HEALTH NOTE
Patient reassessed by telepsych, chart reviewed and verbal handoff received from previous provider. In brief, pt is a 25 year-old  female, with history of Bipolar, Depression, Anxiety, with history of in-patient hospitalization age 17 at [facility] for manic depressive symptoms, with prior self-injurious behaviors but no suicide attempt, self-referred and presents with manic depressive symptoms. Per initial assessment note: "patient presenting with Bipolar, mixed. Patient has passive suicidal ideation. Patient requesting voluntary admission of which she warrants and can benefit from".    On evaluation pt states that she "doing ok" however complains about being woken up by multiple staff members while trying to sleep. Complains about wearing the hospital gown and her cell phone being prohibited in the ED. Otherwise denies complaints, reports mood to currently be "calm" and although admits she was "a little snippy" and "had an attitude" earlier in the evening. States that she is being treated well by ED staff and ate all meals without issue. Denies paranoid ideation or perceptual disturbances. Denies current SI/HI in the ED setting. States she received Li earlier in the evening with no adverse events of side effects. Reports using MJ before arrival to the ED, otherwise denies recent use of substances including ETOH, benzos, or opiates. Continues to request inpt hospitalization, states she would like to stay at  if possible as staff told her that the unit was "better" than [facility] and she has had "good experiences at this hospital".    MSE: well appearing, cooperative, fair hygiene/groom, normal eye contact, no abnormal movements, mood "calm", affect euthymic, thought process linear, thought content with no evidence of delusions or SI/HI in the ED setting, no AVH, AAOX4, impulse control intact at this time, insight and judgement are fair.    See  note by Capo Carlton LMSW for interim ED course.    A/P: At this time pt continues to request voluntary inpt hospitalization for stabilization of mood. Plan as per initial  assessment note:    1. Constant Observation   2. Boarding for bed at  on 5[facility] (declining [facility])  3. Pickwick 300 mg at bedtime.   4. Klonopin 0.5 mg daily for Anxiety  5. Seroquel 50 mg at bedtime for Manic-insomnia

## 2020-07-28 NOTE — PROGRESS NOTE BEHAVIORAL HEALTH - PROBLEM SELECTOR PLAN 1
1. Start lithium 300 mg BID and titrate as needed for stability  2. Start Seroquel 50 mg HS for sleep and titrate as needed.  3. Start Klonopin 0.5 mg BID

## 2020-07-28 NOTE — PROGRESS NOTE BEHAVIORAL HEALTH - NSBHFUPSTRENGTHS_PSY_A_CORE
Has supportive interpersonal relationships with family, friends or peers/Cooperative with treatment/Good impulse control/Intelligent/Motivated and ready for change

## 2020-07-28 NOTE — ED BEHAVIORAL HEALTH NOTE - BEHAVIORAL HEALTH NOTE
Per RN Yu pt. has remained calm and cooperative in ED since initial psych eval.  Pt. has been sleeping for majority of stay, and was given her Lithium 300mg around 10pm.

## 2020-07-28 NOTE — PROGRESS NOTE BEHAVIORAL HEALTH - NSBHADDHXSUBSTFT_PSY_A_CORE
Smokes THC daily, 2-4 blunts a day now, previously from age [age] almost 10 blunts a day. Stopped cigarettes since 2019, smoked Cocaine 2 times, never Heroin or other drugs. Vapes at times due to work. Alcohol sometimes Liquor.

## 2020-07-28 NOTE — H&P ADULT - ATTENDING COMMENTS
Case discussed and reviewed after initial evaluation above by BRIDGER Vicente. Please note my plan below.    24 y/o F with h/o bipolar disorder, admitted to psych service. Hospitalist consulted for medical management.     *Bopolar disorder  -Management as per psych     *Abdominal pain / Nausea / vomiting   -US-  Small cystic tubular structure in the R adnexa suggestive of hydrosalpinx -> f/u w/ Gyn  -CT abd / pelvis for further evaluation   -HCG negative    *DVT ppx  -Encourage ambulation Case discussed and reviewed after initial evaluation above by BRIDGER Vicente. Please note my plan below.    26 y/o F with h/o bipolar disorder, admitted to psych service. Hospitalist consulted for medical management.     *Bipolar disorder  -Management as per psych     *Abdominal pain / Nausea / vomiting   -US-  Small cystic tubular structure in the R adnexa suggestive of hydrosalpinx -> f/u w/ Gyn  -CT abd / pelvis for further evaluation   -HCG negative    *DVT ppx  -Encourage ambulation

## 2020-07-28 NOTE — ED ADULT NURSE REASSESSMENT NOTE - NS ED NURSE REASSESS COMMENT FT1
Report received from DAGO Carl. pt resting in bed with comfortable appearance. pt is calm and cooperative at this time. 1:1 at bedside within arms reach, pt safety maintained. awaiting bed on 5N, will continue to monitor.
received report from rn shadi, pt resting comfortably, vitals stable, ordered breakfast, aware of plan of care awaiting 5n bed, 1-1 maintained
report given to 5n, pt aware of plan of care, belongings returned, pt cooperative
telepsych calling to evaluate pt. pt resting in bed, comfortable appearance. 1:1 at bedside within arms reach. pt safety maintained.
1:1 at bedside.

## 2020-07-28 NOTE — H&P ADULT - ASSESSMENT
26 y/o female with history Bipolar disorder presented to ED with manic and depressive symptoms. Patient now with report of abdominal pain.    # Bipolar disorder  -Management per primary psych team    # Abdominal pain  No Afebrile, leukocytosis or urinary symptoms  -CT abd/pelvis  -Bowel regiment  -Tylenol PRN for pain   -Consider GI consult     DVT ppx  Encourage ambulation

## 2020-07-28 NOTE — PROGRESS NOTE BEHAVIORAL HEALTH - NSBHFUPIPCHARTREVFT_PSY_A_CORE
"I have been feeling manic and depressed."  Patient is a 25 year-old  female, with history of Bipolar, Depression, Anxiety, with history of in-patient hospitalization age 17 at [facility] for manic depressive symptoms, with prior self-injurious behaviors but no suicide attempt, self-referred and presents with manic depressive symptoms.    Patient reports history of psychotropic management from age 17 but was on "too many medications" and so she stopped before going to college. Reports recently feeling like she has been manic and depressed. Reports periods of elevated mood, inflated self esteem, high energy, racing thoughts, insomnia, high appetite, distractibility, and then goes into severe depressed mood, crying, fatigue, low appetite. Reports feeling unstable. Reports last suicidal ideation and self-injurious behaviors being last week. Reports current passive suicidal ideation of wanting "that bad part of her to be dead." Reports chronic anxiety Denies psychotic symptoms. Reports daily marijuana use as means of self medicating. Denies other substance abuse. Reports wanting to restart Lithium however a lower dosage secondary to history of feeling dehydrated on Lithium. Reports liking Wellbutrin for Anxiety (150 - 300 mg). Reports wanting simple psychotropic management as she has a history of being on too many medications at once.

## 2020-07-28 NOTE — H&P ADULT - HISTORY OF PRESENT ILLNESS
26 y/o female with history Bipolar disorder presented to ED with manic and depressive symptoms. Patient reports history of psychotropic management from age 17 but was on "too many medications" and so she stopped before going to college. Reports that she recently starting having periods of elevated mood, inflated self esteem, high energy, racing thoughts, insomnia, high appetite, distractibility, and then goes into severe depressed mood, crying, fatigue, low appetite. Reports feeling unstable.     Consult called for medical management. Today patient reports dull lower abdominal pain R> L with severity of 4/10 aggregated with bowel movement x 2 weeks. Pain is associated with nausea, vomiting and constipation. Feels very gassy. Last BM was 2 days ago. Denies fever, chills, anorexia melena, hematochezia, diarrhea, hematuria, dysuria or urinary frequency. LMP was 7/21/2020. Pt was seen in ED on July 16, pelvis sonogram showed possible ovarian cysts, and d/c home to follow up with GYN. Pt visited GYN office, had full gyn work up last Wednesday. Labs in ED was unremarkable.    Patient was seen and examined at bedside    Family  history   Mother and MGM with Diabetes

## 2020-07-28 NOTE — PROGRESS NOTE BEHAVIORAL HEALTH - NSBHADDHXPSYSOCFT_PSY_A_CORE
Single AAF, domiciled with her father, no children, finished [employer] School, 1 1/2 credit short of associate degree. Plans to complete her Bachelor in computer.

## 2020-07-29 LAB
A1C WITH ESTIMATED AVERAGE GLUCOSE RESULT: 5.1 % — SIGNIFICANT CHANGE UP (ref 4–5.6)
CHOLEST SERPL-MCNC: 152 MG/DL — SIGNIFICANT CHANGE UP (ref 10–199)
ESTIMATED AVERAGE GLUCOSE: 100 MG/DL — SIGNIFICANT CHANGE UP (ref 68–114)
HDLC SERPL-MCNC: 53 MG/DL — SIGNIFICANT CHANGE UP
LIPID PNL WITH DIRECT LDL SERPL: 90 MG/DL — SIGNIFICANT CHANGE UP
SARS-COV-2 IGG SERPL QL IA: NEGATIVE — SIGNIFICANT CHANGE UP
SARS-COV-2 IGM SERPL IA-ACNC: 0.08 INDEX — SIGNIFICANT CHANGE UP
TOTAL CHOLESTEROL/HDL RATIO MEASUREMENT: 2.9 RATIO — LOW (ref 3.3–7.1)
TRIGL SERPL-MCNC: 48 MG/DL — SIGNIFICANT CHANGE UP (ref 10–149)
TSH SERPL-MCNC: 1.47 UU/ML — SIGNIFICANT CHANGE UP (ref 0.34–4.82)

## 2020-07-29 PROCEDURE — 99222 1ST HOSP IP/OBS MODERATE 55: CPT

## 2020-07-29 PROCEDURE — 12345: CPT | Mod: NC

## 2020-07-29 PROCEDURE — 99231 SBSQ HOSP IP/OBS SF/LOW 25: CPT

## 2020-07-29 PROCEDURE — 74177 CT ABD & PELVIS W/CONTRAST: CPT | Mod: 26

## 2020-07-29 RX ORDER — SIMETHICONE 80 MG/1
80 TABLET, CHEWABLE ORAL THREE TIMES A DAY
Refills: 0 | Status: DISCONTINUED | OUTPATIENT
Start: 2020-07-29 | End: 2020-08-04

## 2020-07-29 RX ORDER — POLYETHYLENE GLYCOL 3350 17 G/17G
17 POWDER, FOR SOLUTION ORAL
Refills: 0 | Status: DISCONTINUED | OUTPATIENT
Start: 2020-07-29 | End: 2020-07-31

## 2020-07-29 RX ADMIN — LITHIUM CARBONATE 300 MILLIGRAM(S): 300 TABLET, EXTENDED RELEASE ORAL at 09:12

## 2020-07-29 RX ADMIN — Medication 1 MILLIGRAM(S): at 09:12

## 2020-07-29 RX ADMIN — POLYETHYLENE GLYCOL 3350 17 GRAM(S): 17 POWDER, FOR SOLUTION ORAL at 18:00

## 2020-07-29 RX ADMIN — QUETIAPINE FUMARATE 50 MILLIGRAM(S): 200 TABLET, FILM COATED ORAL at 20:56

## 2020-07-29 RX ADMIN — SIMETHICONE 80 MILLIGRAM(S): 80 TABLET, CHEWABLE ORAL at 20:56

## 2020-07-29 RX ADMIN — LITHIUM CARBONATE 300 MILLIGRAM(S): 300 TABLET, EXTENDED RELEASE ORAL at 20:56

## 2020-07-29 NOTE — PROGRESS NOTE ADULT - SUBJECTIVE AND OBJECTIVE BOX
26 y/o female with history Bipolar disorder presented to ED with manic and depressive symptoms. Patient reports history of psychotropic management from age 17 but was on "too many medications" and so she stopped before going to college. Reports that she recently starting having periods of elevated mood, inflated self esteem, high energy, racing thoughts, insomnia, high appetite, distractibility, and then goes into severe depressed mood, crying, fatigue, low appetite. Reports feeling unstable.     Consult called for medical management. Today patient reports dull lower abdominal pain R> L with severity of 4/10 aggregated with bowel movement x 2 weeks. Pain is associated with nausea, vomiting and constipation. Feels very gassy. Last BM was 2 days ago. Denies fever, chills, anorexia melena, hematochezia, diarrhea, hematuria, dysuria or urinary frequency. LMP was 7/21/2020. Pt was seen in ED on July 16, pelvis sonogram showed possible ovarian cysts, and d/c home to follow up with GYN. Pt visited GYN office, had full gyn work up last Wednesday. Labs in ED was unremarkable.    7/29 - last BM two days ago, freq bouts of constipation   PHYSICAL EXAM:  GENERAL: NAD, able to lie flat in bed  HEAD:  Atraumatic, Normocephalic  EYES: EOMI, PERRLA, normal sclera  ENT: Moist mucous membranes  NECK: Supple, No JVD, no nuchal rigidity  CHEST/LUNG: Clear to auscultation bilaterally; No rales, rhonchi, wheezing, or rubs. Unlabored respirations  HEART: Regular rate and rhythm; No murmurs, rubs, or gallops  ABDOMEN: Bowel sounds present; Soft, Nontender, Nondistended. No hepatomegaly  EXTREMITIES:  no pitting bilaterally  NERVOUS SYSTEM:  Alert & Oriented X3, speech clear. No focal motor or sensory deficits  MSK: FROM all 4 extremities, full and equal strength  SKIN: No rashes or lesions    LABS: All Labs Reviewed:    RADIOLOGY/EKG:  < from: CT Abdomen and Pelvis w/ IV Cont (07.29.20 @ 08:38) >  No acute intra-abdominal pathology visualized.  2.1 cm cystic structure in the left adnexa which may represent a dominant follicle or cyst. If there is pelvic pain, pelvic ultrasound would be recommended for further evaluation.        lithium 300 milliGRAM(s) Oral at bedtime  lithium 300 milliGRAM(s) Oral daily  LORazepam     Tablet 1 milliGRAM(s) Oral every 6 hours PRN  QUEtiapine 50 milliGRAM(s) Oral at bedtime

## 2020-07-29 NOTE — PROGRESS NOTE ADULT - ASSESSMENT
24 y/o female with history Bipolar disorder presented to ED with manic and depressive symptoms. Patient now with report of abdominal pain.    # Bipolar disorder  -Management per primary psych team    # Abdominal pain  No Afebrile, leukocytosis or urinary symptoms  -CT abd/pelvis results discussed with patient: 2cm left adnexal cysts - denies any symptoms; she saw her GYN about 2 weeks ago and everything was normal then  Pt advised to f/u with GYN in 3-4 weeks  -Bowel regimen - miralax BID  advised to stop using MJ   Pt has an appt with a GI as OP     DVT ppx  Encourage ambulation     discussed with RN   Pt is stable, will see again at your request, ty.

## 2020-07-30 PROCEDURE — 99232 SBSQ HOSP IP/OBS MODERATE 35: CPT

## 2020-07-30 RX ORDER — LITHIUM CARBONATE 300 MG/1
600 TABLET, EXTENDED RELEASE ORAL AT BEDTIME
Refills: 0 | Status: DISCONTINUED | OUTPATIENT
Start: 2020-07-30 | End: 2020-08-04

## 2020-07-30 RX ADMIN — POLYETHYLENE GLYCOL 3350 17 GRAM(S): 17 POWDER, FOR SOLUTION ORAL at 09:26

## 2020-07-30 RX ADMIN — LITHIUM CARBONATE 300 MILLIGRAM(S): 300 TABLET, EXTENDED RELEASE ORAL at 09:25

## 2020-07-30 RX ADMIN — Medication 1 MILLIGRAM(S): at 09:26

## 2020-07-30 RX ADMIN — QUETIAPINE FUMARATE 50 MILLIGRAM(S): 200 TABLET, FILM COATED ORAL at 21:17

## 2020-07-30 RX ADMIN — Medication 0.5 MILLIGRAM(S): at 21:17

## 2020-07-30 RX ADMIN — LITHIUM CARBONATE 600 MILLIGRAM(S): 300 TABLET, EXTENDED RELEASE ORAL at 21:17

## 2020-07-30 RX ADMIN — SIMETHICONE 80 MILLIGRAM(S): 80 TABLET, CHEWABLE ORAL at 09:26

## 2020-07-30 RX ADMIN — SIMETHICONE 80 MILLIGRAM(S): 80 TABLET, CHEWABLE ORAL at 21:18

## 2020-07-30 NOTE — PROGRESS NOTE BEHAVIORAL HEALTH - PROBLEM SELECTOR PLAN 1
1. Lithium 600 mg po qhs  ( bipolar d/o)  2. Seroquel 50 mg HS for sleep and titrate as needed.  3. Lower Ativan dose to 0.5 mg po tid standing + 0.5 mg po q 12 prn (anxiety)  4.Pt encouraged to attend therapy groups as tolerated  5.Collateral pt history from pt family with pt permission  6.Disposition planning ongoing

## 2020-07-30 NOTE — PROGRESS NOTE BEHAVIORAL HEALTH - NSBHCHARTREVIEWIMAGING_PSY_A_CORE FT
MEDICATIONS  (STANDING):  lithium 600 milliGRAM(s) Oral at bedtime  LORazepam     Tablet 0.5 milliGRAM(s) Oral three times a day  polyethylene glycol 3350 17 Gram(s) Oral two times a day  QUEtiapine 50 milliGRAM(s) Oral at bedtime  simethicone 80 milliGRAM(s) Chew three times a day    MEDICATIONS  (PRN):  LORazepam     Tablet 0.5 milliGRAM(s) Oral every 12 hours PRN Anxiety

## 2020-07-31 PROCEDURE — 99232 SBSQ HOSP IP/OBS MODERATE 35: CPT

## 2020-07-31 RX ORDER — POLYETHYLENE GLYCOL 3350 17 G/17G
17 POWDER, FOR SOLUTION ORAL
Refills: 0 | Status: DISCONTINUED | OUTPATIENT
Start: 2020-07-31 | End: 2020-08-04

## 2020-07-31 RX ADMIN — Medication 0.5 MILLIGRAM(S): at 14:28

## 2020-07-31 RX ADMIN — QUETIAPINE FUMARATE 50 MILLIGRAM(S): 200 TABLET, FILM COATED ORAL at 20:46

## 2020-07-31 RX ADMIN — SIMETHICONE 80 MILLIGRAM(S): 80 TABLET, CHEWABLE ORAL at 14:27

## 2020-07-31 RX ADMIN — SIMETHICONE 80 MILLIGRAM(S): 80 TABLET, CHEWABLE ORAL at 09:07

## 2020-07-31 RX ADMIN — SIMETHICONE 80 MILLIGRAM(S): 80 TABLET, CHEWABLE ORAL at 20:46

## 2020-07-31 RX ADMIN — POLYETHYLENE GLYCOL 3350 17 GRAM(S): 17 POWDER, FOR SOLUTION ORAL at 09:07

## 2020-07-31 RX ADMIN — Medication 0.5 MILLIGRAM(S): at 09:07

## 2020-07-31 RX ADMIN — Medication 0.5 MILLIGRAM(S): at 20:46

## 2020-07-31 RX ADMIN — LITHIUM CARBONATE 600 MILLIGRAM(S): 300 TABLET, EXTENDED RELEASE ORAL at 20:46

## 2020-07-31 NOTE — PROGRESS NOTE BEHAVIORAL HEALTH - NSBHCHARTREVIEWIMAGING_PSY_A_CORE FT
MEDICATIONS  (STANDING):  lithium 600 milliGRAM(s) Oral at bedtime  LORazepam     Tablet 0.5 milliGRAM(s) Oral three times a day  polyethylene glycol 3350 17 Gram(s) Oral two times a day  QUEtiapine 50 milliGRAM(s) Oral at bedtime  simethicone 80 milliGRAM(s) Chew three times a day    MEDICATIONS  (PRN):  LORazepam     Tablet 0.5 milliGRAM(s) Oral every 12 hours PRN Anxiety MEDICATIONS  (STANDING):  lithium 600 milliGRAM(s) Oral at bedtime  LORazepam     Tablet 0.5 milliGRAM(s) Oral three times a day  QUEtiapine 50 milliGRAM(s) Oral at bedtime  simethicone 80 milliGRAM(s) Chew three times a day    MEDICATIONS  (PRN):  LORazepam     Tablet 0.5 milliGRAM(s) Oral every 12 hours PRN Anxiety  polyethylene glycol 3350 17 Gram(s) Oral two times a day PRN constipation

## 2020-07-31 NOTE — PROGRESS NOTE BEHAVIORAL HEALTH - OTHER
quite animated " I feel better." mildly elated, animated less sombre  and more future oriented intense and earnest at times slowly improving

## 2020-07-31 NOTE — PROGRESS NOTE BEHAVIORAL HEALTH - PROBLEM SELECTOR PLAN 1
1. Lithium 600 mg po qhs  ( bipolar d/o)  2. Seroquel 50 mg HS for sleep and titrate as needed.  3. Continue Ativan dose to 0.5 mg po tid standing + 0.5 mg po q 12 prn (anxiety)  4.Pt encouraged to attend therapy groups as tolerated  5.Collateral pt history from pt family with pt permission  6.Disposition planning ongoing 1. Lithium 600 mg po qhs  ( bipolar d/o)  2. Seroquel 50 mg HS for sleep and titrate as needed.  3. Continue Ativan dose to 0.5 mg po tid standing + 0.5 mg po q 12 prn (anxiety)  4.Pt encouraged to attend therapy groups as tolerated  5.Collateral pt history from pt family with pt permission  6. Lithium level, CBC with diff, CMP on 8/4/2020  7..Disposition planning ongoing

## 2020-08-01 PROCEDURE — 99232 SBSQ HOSP IP/OBS MODERATE 35: CPT

## 2020-08-01 RX ORDER — QUETIAPINE FUMARATE 200 MG/1
150 TABLET, FILM COATED ORAL AT BEDTIME
Refills: 0 | Status: DISCONTINUED | OUTPATIENT
Start: 2020-08-01 | End: 2020-08-04

## 2020-08-01 RX ADMIN — QUETIAPINE FUMARATE 150 MILLIGRAM(S): 200 TABLET, FILM COATED ORAL at 21:28

## 2020-08-01 RX ADMIN — Medication 0.5 MILLIGRAM(S): at 21:28

## 2020-08-01 RX ADMIN — SIMETHICONE 80 MILLIGRAM(S): 80 TABLET, CHEWABLE ORAL at 13:17

## 2020-08-01 RX ADMIN — SIMETHICONE 80 MILLIGRAM(S): 80 TABLET, CHEWABLE ORAL at 09:14

## 2020-08-01 RX ADMIN — Medication 0.5 MILLIGRAM(S): at 13:17

## 2020-08-01 RX ADMIN — Medication 0.5 MILLIGRAM(S): at 15:59

## 2020-08-01 RX ADMIN — Medication 0.5 MILLIGRAM(S): at 09:14

## 2020-08-01 RX ADMIN — SIMETHICONE 80 MILLIGRAM(S): 80 TABLET, CHEWABLE ORAL at 21:28

## 2020-08-01 RX ADMIN — LITHIUM CARBONATE 600 MILLIGRAM(S): 300 TABLET, EXTENDED RELEASE ORAL at 21:28

## 2020-08-01 NOTE — PROGRESS NOTE BEHAVIORAL HEALTH - PROBLEM SELECTOR PLAN 1
1. Lithium 600 mg po qhs  ( bipolar d/o)  2. Seroquel 50 mg HS for sleep and titrate as needed.  3. Continue Ativan dose to 0.5 mg po tid standing + 0.5 mg po q 12 prn (anxiety)  4.Pt encouraged to attend therapy groups as tolerated  5.Collateral pt history from pt family with pt permission  6. Lithium level, CBC with diff, CMP on 8/4/2020  7..Disposition planning ongoing

## 2020-08-02 PROCEDURE — 99232 SBSQ HOSP IP/OBS MODERATE 35: CPT

## 2020-08-02 RX ORDER — ACETAMINOPHEN 500 MG
650 TABLET ORAL ONCE
Refills: 0 | Status: COMPLETED | OUTPATIENT
Start: 2020-08-02 | End: 2020-08-02

## 2020-08-02 RX ADMIN — Medication 650 MILLIGRAM(S): at 22:45

## 2020-08-02 RX ADMIN — SIMETHICONE 80 MILLIGRAM(S): 80 TABLET, CHEWABLE ORAL at 13:32

## 2020-08-02 RX ADMIN — QUETIAPINE FUMARATE 150 MILLIGRAM(S): 200 TABLET, FILM COATED ORAL at 20:58

## 2020-08-02 RX ADMIN — SIMETHICONE 80 MILLIGRAM(S): 80 TABLET, CHEWABLE ORAL at 08:51

## 2020-08-02 RX ADMIN — Medication 0.5 MILLIGRAM(S): at 20:57

## 2020-08-02 RX ADMIN — Medication 0.5 MILLIGRAM(S): at 13:33

## 2020-08-02 RX ADMIN — SIMETHICONE 80 MILLIGRAM(S): 80 TABLET, CHEWABLE ORAL at 20:57

## 2020-08-02 RX ADMIN — Medication 0.5 MILLIGRAM(S): at 08:50

## 2020-08-02 RX ADMIN — LITHIUM CARBONATE 600 MILLIGRAM(S): 300 TABLET, EXTENDED RELEASE ORAL at 20:57

## 2020-08-02 RX ADMIN — Medication 650 MILLIGRAM(S): at 22:30

## 2020-08-03 PROCEDURE — 99232 SBSQ HOSP IP/OBS MODERATE 35: CPT

## 2020-08-03 RX ORDER — HYDROXYZINE HCL 10 MG
25 TABLET ORAL EVERY 8 HOURS
Refills: 0 | Status: DISCONTINUED | OUTPATIENT
Start: 2020-08-03 | End: 2020-08-04

## 2020-08-03 RX ADMIN — Medication 0.5 MILLIGRAM(S): at 21:14

## 2020-08-03 RX ADMIN — SIMETHICONE 80 MILLIGRAM(S): 80 TABLET, CHEWABLE ORAL at 14:05

## 2020-08-03 RX ADMIN — QUETIAPINE FUMARATE 150 MILLIGRAM(S): 200 TABLET, FILM COATED ORAL at 21:14

## 2020-08-03 RX ADMIN — SIMETHICONE 80 MILLIGRAM(S): 80 TABLET, CHEWABLE ORAL at 21:14

## 2020-08-03 RX ADMIN — Medication 0.5 MILLIGRAM(S): at 08:55

## 2020-08-03 RX ADMIN — LITHIUM CARBONATE 600 MILLIGRAM(S): 300 TABLET, EXTENDED RELEASE ORAL at 21:14

## 2020-08-03 RX ADMIN — Medication 0.5 MILLIGRAM(S): at 14:05

## 2020-08-03 RX ADMIN — SIMETHICONE 80 MILLIGRAM(S): 80 TABLET, CHEWABLE ORAL at 08:55

## 2020-08-03 NOTE — PROGRESS NOTE BEHAVIORAL HEALTH - PROBLEM SELECTOR PLAN 1
1. Lithium 600 mg po qhs  ( bipolar d/o)  2. Seroquel 50 mg HS for sleep and titrate as needed.  3. To lower  Ativan dose to 0.5 mg po bid standing + 0.5 mg po q 12 prn (anxiety)  4.Pt encouraged to attend therapy groups as tolerated  5.Collateral pt history from pt family with pt permission  6. Lithium level, CBC with diff, CMP on 8/4/2020  7..Disposition planning ongoing with pt agreeing to intake at [facility] for therapy and for med management as part of the pt's after care treatment plan.

## 2020-08-03 NOTE — PROGRESS NOTE BEHAVIORAL HEALTH - NSBHCHARTREVIEWIMAGING_PSY_A_CORE FT
MEDICATIONS  (STANDING):  lithium 600 milliGRAM(s) Oral at bedtime  LORazepam     Tablet 0.5 milliGRAM(s) Oral two times a day  QUEtiapine 150 milliGRAM(s) Oral at bedtime  simethicone 80 milliGRAM(s) Chew three times a day    MEDICATIONS  (PRN):  LORazepam     Tablet 0.5 milliGRAM(s) Oral every 12 hours PRN Anxiety  polyethylene glycol 3350 17 Gram(s) Oral two times a day PRN constipation

## 2020-08-04 VITALS — OXYGEN SATURATION: 100 % | TEMPERATURE: 99 F | RESPIRATION RATE: 18 BRPM

## 2020-08-04 LAB
ALBUMIN SERPL ELPH-MCNC: 4 G/DL — SIGNIFICANT CHANGE UP (ref 3.3–5)
ALP SERPL-CCNC: 63 U/L — SIGNIFICANT CHANGE UP (ref 40–120)
ALT FLD-CCNC: 19 U/L — SIGNIFICANT CHANGE UP (ref 12–78)
ANION GAP SERPL CALC-SCNC: 7 MMOL/L — SIGNIFICANT CHANGE UP (ref 5–17)
AST SERPL-CCNC: 11 U/L — LOW (ref 15–37)
BASOPHILS # BLD AUTO: 0.07 K/UL — SIGNIFICANT CHANGE UP (ref 0–0.2)
BASOPHILS NFR BLD AUTO: 0.7 % — SIGNIFICANT CHANGE UP (ref 0–2)
BILIRUB SERPL-MCNC: 0.6 MG/DL — SIGNIFICANT CHANGE UP (ref 0.2–1.2)
BUN SERPL-MCNC: 11 MG/DL — SIGNIFICANT CHANGE UP (ref 7–23)
CALCIUM SERPL-MCNC: 8.9 MG/DL — SIGNIFICANT CHANGE UP (ref 8.5–10.1)
CHLORIDE SERPL-SCNC: 105 MMOL/L — SIGNIFICANT CHANGE UP (ref 96–108)
CO2 SERPL-SCNC: 26 MMOL/L — SIGNIFICANT CHANGE UP (ref 22–31)
CREAT SERPL-MCNC: 0.98 MG/DL — SIGNIFICANT CHANGE UP (ref 0.5–1.3)
EOSINOPHIL # BLD AUTO: 0.27 K/UL — SIGNIFICANT CHANGE UP (ref 0–0.5)
EOSINOPHIL NFR BLD AUTO: 2.6 % — SIGNIFICANT CHANGE UP (ref 0–6)
GLUCOSE SERPL-MCNC: 73 MG/DL — SIGNIFICANT CHANGE UP (ref 70–99)
HCT VFR BLD CALC: 42.8 % — SIGNIFICANT CHANGE UP (ref 34.5–45)
HGB BLD-MCNC: 13.8 G/DL — SIGNIFICANT CHANGE UP (ref 11.5–15.5)
IMM GRANULOCYTES NFR BLD AUTO: 0.3 % — SIGNIFICANT CHANGE UP (ref 0–1.5)
LITHIUM SERPL-MCNC: 0.4 MMOL/L — LOW (ref 0.6–1.2)
LYMPHOCYTES # BLD AUTO: 2.25 K/UL — SIGNIFICANT CHANGE UP (ref 1–3.3)
LYMPHOCYTES # BLD AUTO: 21.3 % — SIGNIFICANT CHANGE UP (ref 13–44)
MCHC RBC-ENTMCNC: 31.2 PG — SIGNIFICANT CHANGE UP (ref 27–34)
MCHC RBC-ENTMCNC: 32.2 GM/DL — SIGNIFICANT CHANGE UP (ref 32–36)
MCV RBC AUTO: 96.8 FL — SIGNIFICANT CHANGE UP (ref 80–100)
MONOCYTES # BLD AUTO: 0.46 K/UL — SIGNIFICANT CHANGE UP (ref 0–0.9)
MONOCYTES NFR BLD AUTO: 4.4 % — SIGNIFICANT CHANGE UP (ref 2–14)
NEUTROPHILS # BLD AUTO: 7.47 K/UL — HIGH (ref 1.8–7.4)
NEUTROPHILS NFR BLD AUTO: 70.7 % — SIGNIFICANT CHANGE UP (ref 43–77)
PLATELET # BLD AUTO: 316 K/UL — SIGNIFICANT CHANGE UP (ref 150–400)
POTASSIUM SERPL-MCNC: 3.9 MMOL/L — SIGNIFICANT CHANGE UP (ref 3.5–5.3)
POTASSIUM SERPL-SCNC: 3.9 MMOL/L — SIGNIFICANT CHANGE UP (ref 3.5–5.3)
PROT SERPL-MCNC: 7.6 GM/DL — SIGNIFICANT CHANGE UP (ref 6–8.3)
RBC # BLD: 4.42 M/UL — SIGNIFICANT CHANGE UP (ref 3.8–5.2)
RBC # FLD: 11.4 % — SIGNIFICANT CHANGE UP (ref 10.3–14.5)
SODIUM SERPL-SCNC: 138 MMOL/L — SIGNIFICANT CHANGE UP (ref 135–145)
WBC # BLD: 10.55 K/UL — HIGH (ref 3.8–10.5)
WBC # FLD AUTO: 10.55 K/UL — HIGH (ref 3.8–10.5)

## 2020-08-04 PROCEDURE — 99232 SBSQ HOSP IP/OBS MODERATE 35: CPT

## 2020-08-04 RX ORDER — HYDROXYZINE HCL 10 MG
1 TABLET ORAL
Qty: 42 | Refills: 1
Start: 2020-08-04 | End: 2020-09-01

## 2020-08-04 RX ORDER — QUETIAPINE FUMARATE 200 MG/1
3 TABLET, FILM COATED ORAL
Qty: 42 | Refills: 1
Start: 2020-08-04 | End: 2020-08-31

## 2020-08-04 RX ORDER — LITHIUM CARBONATE 300 MG/1
1 TABLET, EXTENDED RELEASE ORAL
Qty: 14 | Refills: 1
Start: 2020-08-04 | End: 2020-08-31

## 2020-08-04 RX ORDER — HYDROXYZINE HCL 10 MG
1 TABLET ORAL
Qty: 42 | Refills: 1
Start: 2020-08-04 | End: 2020-08-31

## 2020-08-04 RX ORDER — LITHIUM CARBONATE 300 MG/1
1 TABLET, EXTENDED RELEASE ORAL
Qty: 14 | Refills: 1
Start: 2020-08-04 | End: 2020-09-01

## 2020-08-04 RX ORDER — QUETIAPINE FUMARATE 200 MG/1
3 TABLET, FILM COATED ORAL
Qty: 42 | Refills: 1
Start: 2020-08-04 | End: 2020-09-01

## 2020-08-04 RX ADMIN — Medication 25 MILLIGRAM(S): at 11:47

## 2020-08-04 RX ADMIN — SIMETHICONE 80 MILLIGRAM(S): 80 TABLET, CHEWABLE ORAL at 08:10

## 2020-08-04 RX ADMIN — Medication 0.5 MILLIGRAM(S): at 11:46

## 2020-08-04 NOTE — PROGRESS NOTE BEHAVIORAL HEALTH - NSBHCHARTREVIEWIMAGING_PSY_A_CORE FT
MEDICATIONS  (STANDING):  lithium 600 milliGRAM(s) Oral at bedtime  LORazepam     Tablet 0.5 milliGRAM(s) Oral two times a day  QUEtiapine 150 milliGRAM(s) Oral at bedtime  simethicone 80 milliGRAM(s) Chew three times a day    MEDICATIONS  (PRN):  hydrOXYzine hydrochloride 25 milliGRAM(s) Oral every 8 hours PRN Anxiety  polyethylene glycol 3350 17 Gram(s) Oral two times a day PRN constipation

## 2020-08-04 NOTE — PROGRESS NOTE BEHAVIORAL HEALTH - PROBLEM SELECTOR PROBLEM 1
Bipolar disorder, current episode mixed, moderate

## 2020-08-04 NOTE — DISCHARGE NOTE BEHAVIORAL HEALTH - NSBHDCSUICFCTRMIT_PSY_A_CORE
pt is motivated to succeed and to accomplish goals related to education and career  Pt with ambivalence but with positive feelings towards her young step sister who idolizes the pt

## 2020-08-04 NOTE — DISCHARGE NOTE BEHAVIORAL HEALTH - NSBHDCADDR1FT_A_CORE
[address] [place] [address]  appointment is with Mitchel Araya LMSW.  Please contact Mitchel at [phone]. Meeting ID number [id number] for zoom telehealth intake.

## 2020-08-04 NOTE — PROGRESS NOTE BEHAVIORAL HEALTH - THOUGHT CONTENT
Unremarkable/Other
Unremarkable/Other
Suicidality
Unremarkable
Preoccupations/Guilt/Ruminations
Unremarkable
Suicidality
Unremarkable

## 2020-08-04 NOTE — PROGRESS NOTE BEHAVIORAL HEALTH - NSBHCHARTREVIEWINVESTIGATE_PSY_A_CORE FT
< from: 12 Lead ECG (07.27.20 @ 15:24) >    Ventricular Rate 78 BPM    Atrial Rate 78 BPM    P-R Interval 166 ms    QRS Duration 76 ms    Q-T Interval 384 ms    QTC Calculation(Bezet) 437 ms    P Axis 64 degrees    R Axis 98 degrees    T Axis 38 degrees    Diagnosis Line Normal sinus rhythm with sinus arrhythmia  Rightward axis  Nonspecific T wave abnormality  Abnormal ECG    Confirmed by GRACIELA PEREZ MD ([id number]) on 7/28/2020 2:20:10 PM

## 2020-08-04 NOTE — DISCHARGE NOTE BEHAVIORAL HEALTH - NSBHDCSUICFCTROTHERFT_PSY_A_CORE
perceived or misperceived feelings of abandonment  social isolation, boredom  resumption of heavy THC use  ongoing family tensions between pt and her father especially

## 2020-08-04 NOTE — PROGRESS NOTE BEHAVIORAL HEALTH - PROBLEM SELECTOR PLAN 1
1. Lithium 600 mg po qhs  ( bipolar d/o)  2. Seroquel increased to 150 mg HS ( affective dysregulation0  3. To lower  Ativan dose to 0.5 mg po bid standing + 0.5 mg po q 12 prn (anxiety)  4.Atarax 25 mg po q 8 prn 9 anxiety)  5.Pt for DC home 8/4/2020  6.Collateral pt history from pt family with pt permission  7. Lithium level, CBC with diff, CMP checked on 8/4/2020  8..Disposition planning ongoing with pt agreeing to intake on 8/6/2020 at 3:15 pm  at [facility] for therapy and for med management as part of the pt's after care treatment plan.

## 2020-08-04 NOTE — DISCHARGE NOTE BEHAVIORAL HEALTH - NSBHDCPTEDU_PSY_A_CORE
Total Number Of Aks Treated: 3 Consent: The patient's consent was obtained including but not limited to risks of crusting, scabbing, blistering, scarring, darker or lighter pigmentary change, recurrence, incomplete removal and infection. Render Post-Care Instructions In Note?: no Detail Level: Zone Duration Of Freeze Thaw-Cycle (Seconds): 10 Post-Care Instructions: I reviewed with the patient in detail post-care instructions. Patient is to wear sunprotection, and avoid picking at any of the treated lesions. Pt may apply Vaseline to crusted or scabbing areas. yes

## 2020-08-04 NOTE — PROGRESS NOTE BEHAVIORAL HEALTH - NS ED [facility] MED ROS ALLERGIC IMMUNOLOGIC
Occupational Therapy Daily Treatment      Visit Count: 3  Plan of Care/Certification Dates: Initial: 5/2/2017 Through: 7/25/2017  Insurance Information: occupational therapy cap of $1980/$3700 per calendar year, $0 used at the time of evaluation  Next Referring Provider Visit: Not scheduled     Referred by: Dr. Juve Espinal MD  Medical Diagnosis (from order): Left Shoulder Capsulitis, RTC Tendinopathy  Treatment Diagnosis: Shoulder Symptoms with Pain, Impaired Posture, Impaired Joint Mobility, Impaired Range of Motion, Impaired Motor Function/Muscle Performance and Movement Coordination Impairments  Insurance: 1. MEDICARE 2. [employer]     Date of Onset: new onset; R CVA on 1/2/17  Diagnosis Precautions: Hx of CVA x 3 - 2009, 2016 and 2017  Relevant co-morbidities and medications:       Patient Active Problem List   Diagnosis   • Acute ischemic stroke   • Essential hypertension, benign   • Elevated blood sugar   • Hemiparesis, left   • Impaired mobility and activities of daily living   • Sleep apnea   • Sinusitis, chronic   • Gastroesophageal reflux disease   • Essential (primary) hypertension       Medications           Current Outpatient Prescriptions   Medication Sig Dispense Refill   • atorvastatin (LIPITOR) 80 MG tablet Take 1 tablet by mouth nightly. 30 tablet 0   • acetaminophen (TYLENOL) 500 MG tablet Take 1,000 mg by mouth every 6 hours as needed for Pain.       • loratadine-pseudoephedrine (CLARITIN-D 24 HOUR)  MG per 24 hr tablet Take 1 tablet by mouth daily as needed for Allergies.       • Omega-3 Fatty Acids (FISH OIL) 1000 MG capsule Take 1 g by mouth daily.       • cholecalciferol (VITAMIN D3) 1000 UNITS tablet Take 1,000 Units by mouth daily.       • Multiple Vitamins-Minerals (MULTIVITAMIN ADULT PO) Take 1 tablet by mouth daily.        • Coenzyme Q10 (COQ10) 100 MG Cap Take 100 mg by mouth daily.        • clopidogrel (PLAVIX) 75 MG tablet Take 1 tablet by mouth daily. 30 tablet 11   • 
No complaints
lisinopril (ZESTRIL) 10 MG tablet Take 10 mg by mouth daily.       • escitalopram (LEXAPRO) 10 MG tablet Take 10 mg by mouth daily.          No current facility-administered medications for this encounter.             Relevant Tests: Relevant diagnostic tests: plain film radiograph       SUBJECTIVE   Pt reports feeling better.  Pt states using arm more with daily activities with overall less pain.    Current Pain: 0-3/10.    Functional Change: PT reports some improvement in motion.     OBJECTIVE   Hand Dominance: right     Posture/Observation:  Pt reports numbness/tingling in left hand (stroke related)  Pt reports approximately 60% loss in strength in  strength on left.   Pt with forward posture and forward shoulders. Pt sits with right lateral trunk flexion but appropriate weight distribution between right/left buttocks.   Right shoulder GH anterior medial to acromion.      Range of Motion (degrees): Shoulder Active Range of Motion  [] All motions within functional/normal limits except those noted.   [] Only those motions that were assessed are noted.   [x] Uninvolved motion within functional/normal limits.    Norm Left Right Involved   Date   Initial Initial     Flexion 170-180  80* with shoulder hike     Supine PROM 100* 165     Extension 50-60         Abduction 170-180   160     Adduction 50-75 55* with shoulder hike       Internal Rotation      Supine @90 - 55  @90 - 55     Internal Rotation Behind Back T7 Left buttock T10     External Rotation  80-90 @0 - 25  @45 - 30     Supine @90 - 30 @90 - 80     [standard testing positions unless otherwise noted]  Comments: Left shoulder with significant shoulder hike with UT use and elevation of entire shoulder complex. Pt with neurological impairment in addition to orthopedic impairment to shoulder.; * denotes pain     Scapular Assessment:    Left Right   Resting Position elevated, winging depressed   Scapulohumeral Rhythm impaired, poor eccentric with 
increased winging, poor dissociation between scapula and GHJ within functional limits   Comments; * denotes pain     Strength: (out of 5)  [] Gross muscle strength within functional/normal limits except as noted.  [] Only muscle strength that was assessed are noted.  [x] Uninvolved muscle strength within functional/normal limits.    Left Right Involved   Date Initial Initial     SHOULDER         Flexion 2- 5     Extension         Abduction 2- 5     Abd@30 (IAOM) 4- 5     Adduction         Internal Rotation 4+ 5     External Rotation 4 5     ELBOW         Flexion         Extension         [standard testing positions unless otherwise noted]  Comments; * denotes pain     Muscle Flexibility:  Latissimus - Left: moderate tightness  Pectoralis Major - Left: severe tightness  Pectoralis Minor - Left: severe tightness  Upper Trapezius - Left: moderate tightness      Palpation:  Pt with tenderness over RC at anterior acromion.      Joint Play Assessment:  Pt with global tightness in all planes     Outcome Measures:   Disabilities of the Arm, Shoulder and Hand (DASH): DASH Score Calculated: 46.67 (scored 0-100; a higher score indicates greater disability)       Treatment   Therapeutic Exercise:   UBE@4.0 x 5 min forward/retro  PROM with stretching ER@45 and 90 in scaption, IR@45 and 90, flex, D2 diagonal  ER in sidelying with assist x 15 x 2  Supine ER@90 with min resistance x 15 - significant difficulty/weakness  Supine flex x 15  Rhythmic stabilization in supine flex  Axial rotations in supine flex x 20  Seated flex x 8 with focus on decreased UT use  Wall pushups x 15 - mild anterior discomfort    Manual Therapy:   STM pecs and subscap/teres  Scapular mob - circular, distraction  GH oscillations, inferior glides in abd, posterior glides, mobilization with movement ER/IR@90       Current Home Program (not performed this date except as noted above):   Home Exercise Program:  Exercise: Date issued Date DC Comments   Supine 
ER@45 with dowel  Supine flex with dowel   Supine bilateral scapular protraction with dowel   Seated scapular squeezes 5/2/17       Axial rotations in supine flex 5/4/17       Wall pushups 5/9/17                                  ASSESSMENT   Pt with improvement in flex at end of session.  Pt still with significant capsular and ST tightness with pain at onset of tightness.  Pt with weakness, decreased coordination and tone changes from CVA also impacting shoulder function.        Pain after treatment: 2-3/10  Result of above outlined education: Verbalizes understanding and Demonstrates understanding    Goals:       To be obtained by end of this plan of care:  1. Patient independent with modified and progressed home exercise program.  2. Patient will decrease involved shoulder pain to 0-3/10 to aid in normalization of upper extremity movements to aid activities of independent daily living, tolerating 30 minutes of upper extremity activity to cook/eat a meal, completion of self care tasks, completion of household tasks for independent living, age appropriate activities.   3. Patient will increase involved shoulder active range of motion to internal rotation 50°, external rotation 45°, abduction/flexion 100° to aid in normalization of upper extremity movements to aid activities of independent daily living, reaching behind back for independent living tasks, completion of self care tasks, completion of household tasks for independent living, age appropriate activities.   4. Patient will increase involved shoulder strength to 4/5 to aid in normalization of upper extremity movements to aid activities of independent daily living, tolerating 30 minutes of upper extremity activity to cook/eat a meal, completion of self care tasks, completion of household tasks for independent living, age appropriate activities.  5. Patient will be able to reach behind back with minimal pain/difficulty to improve function in dressing. 
  6. Patient will be able to reach over head with minimal pain/difficulty to improve function in cooking, reaching into cupboard, grooming.  7. Patient will be able to sleep 6 hours without disruption from pain.   8. Patient will demonstrate within functional limits involved scapulohumeral rhythm to aid in tolerating 30 minutes of upper extremity activity to cook/eat a meal, completion of self care tasks, completion of household tasks for independent living, age appropriate activities.  9. DASH: Patient will complete form to reflect an improved score from initial score of 46.67 to less than or equal to 30 (scored 0-100; a higher score indicates greater disability) to indicate pt reported improvement in function/disability/impairment (minimal detectable change: 15 points).    PLAN   UBE, MT, ROM, strengthening, HEP    THERAPY DAILY BILLING   Primary Insurance: MEDICARE  Secondary Insurance: [employer]    Evaluation Procedures:  No evaluation codes were used on this date of service    Timed Procedures:  Manual Therapy, 15 minutes  Neuromuscular Re-Education, 10 minutes  Therapeutic Exercise, 20 minutes    Untimed Procedures:  No untimed codes were used on this date of service    Total Treatment Time:  45 minutes        G-Code:  G-Code Score ABN form  reporting not required this treatment session  Modifier based on outcome measure(s)/functional testing/clinical judgement as listed above    The referring provider's electronic or written signature on the evaluation authorizes the therapy plan of care and certifies the need for these services, furnished under this plan of care while under their care.  Plan of care mailed/faxed to referring physician on 5/2/17     
No complaints

## 2020-08-04 NOTE — PROGRESS NOTE BEHAVIORAL HEALTH - BODY HABITUS
Average build/Well nourished
Well nourished
Well nourished/Average build
Well nourished/Average build
Well nourished
Well nourished/Average build
Average build/Well nourished
Average build/Well nourished

## 2020-08-04 NOTE — PROGRESS NOTE BEHAVIORAL HEALTH - NSBHADMITIPOBSFT_PSY_A_CORE
Pt denies current SI /HI
Routine admission
Pt denies current SI /HI
Pt denies current SI /HI
Routine admission

## 2020-08-04 NOTE — PROGRESS NOTE BEHAVIORAL HEALTH - PROBLEM SELECTOR PLAN 2
1. Ongoing staff support and encouragement  2.Pt encouraged to attend therapy groups with focus on substance use treatment options   3.Harm reduction discussed with pt as to already pt significant decrease in use of THC  4.Dual diagnosis treatment recommended as part of after care pt treatment

## 2020-08-04 NOTE — DISCHARGE NOTE BEHAVIORAL HEALTH - NSBHDCMEDSFT_PSY_A_CORE
Patient educated to not stop any medication unless otherwise told to do so by outpatient provider Discharge Medications:  lithium 600 milliGRAM(s) Oral at bedtime  LORazepam     Tablet 0.5 milliGRAM(s) Oral two times a day with plan to taper and DC  QUEtiapine 150 milliGRAM(s) Oral at bedtim    MEDICATIONS  (PRN):  hydrOXYzine hydrochloride 25 milliGRAM(s) Oral every 8 hours PRN Anxiet    Patient educated to not stop any medication unless otherwise told to do so by outpatient provider

## 2020-08-04 NOTE — PROGRESS NOTE BEHAVIORAL HEALTH - NS ED BHA MSE GENERAL APPEARANCE
No deformities present/Well developed
Well developed
No deformities present/Well developed
No deformities present/Well developed
Well developed
Well developed/No deformities present
Well developed/No deformities present
No deformities present/Well developed

## 2020-08-04 NOTE — DISCHARGE NOTE BEHAVIORAL HEALTH - NSBHDCSUICSAFETYFT_PSY_A_CORE
1. Safety planning reviewed and the pt. is amenable to this plan  2.Pt to have intake appointment at [facility] on 8/6/2020  for regular therapy and med management    3.Pt to be given tel # for 24/7 Crisis Hotline to call with future concerns/questions  4.Pt aware she can contact Dr Cabrera at [facility] prior to pt intake appointment with any questions/concerns  5.Pt aware that she can always return to the nearest hospital ED 24/7 for emergency reevaluation should new pt safety concerns arise

## 2020-08-04 NOTE — DISCHARGE NOTE BEHAVIORAL HEALTH - CARE PROVIDER_API CALL
denia loza  Pt has intake appointment at [facility] on 8/6/2020 at 3:15 pm for referral to individual therapy and med management  Phone: (   )    -  Fax: (   )    -  Follow Up Time:

## 2020-08-04 NOTE — PROGRESS NOTE BEHAVIORAL HEALTH - SECONDARY DX1
Cannabis use, unspecified, uncomplicated

## 2020-08-04 NOTE — PROGRESS NOTE BEHAVIORAL HEALTH - AFFECT QUALITY
Other
Other
Irritable
Depressed/Anxious/Elevated
Euthymic
Anxious/Depressed/Elevated/Irritable
Euthymic
Depressed/Anxious/Elevated

## 2020-08-04 NOTE — DISCHARGE NOTE BEHAVIORAL HEALTH - HPI (INCLUDE ILLNESS QUALITY, SEVERITY, DURATION, TIMING, CONTEXT, MODIFYING FACTORS, ASSOCIATED SIGNS AND SYMPTOMS)
Patient is a 25 year-old  female, with history of Bipolar, Depression, Anxiety, with history of in-patient hospitalization age 17 at [facility] for manic depressive symptoms, with prior self-injurious behaviors but no suicide attempt, self-referred and presents with manic depressive symptoms.    Patient reports history of psychotropic management from age 17 but was on "too many medications" and so she stopped before going to college. Reports recently feeling like she has been manic and depressed. Reports periods of elevated mood, inflated self esteem, high energy, racing thoughts, insomnia, high appetite, distractibility, and then goes into severe depressed mood, crying, fatigue, low appetite. Reports feeling unstable. Reports last suicidal ideation and self-injurious behaviors being last week. Reports current passive suicidal ideation of wanting "that bad part of her to be dead." Reports chronic anxiety Denies psychotic symptoms. Reports daily marijuana use as means of self medicating. Denies other substance abuse. Reports wanting to restart Lithium however a lower dosage secondary to history of feeling dehydrated on Lithium. Reports liking Wellbutrin for Anxiety (150 - 300 mg). Reports wanting simple psychotropic management as she has a history of being on too many medications at once. Principal diagnosis at discharge: Bipolar disorder - current episode mixed, moderate without psychotic features  The pt. is a  25 year-old  female, with history affective dysregulation possibly consistent with a bipolar d/o, and a history of THC use disorder ,with a  history of an inpatient hospitalization age 17 at [facility] for manic depressive symptoms, with prior self-injurious behaviors but no suicide attempt, self-referred and presents with manic depressive symptoms.   Per initial ED evaluation on 7/28/2020:         Patient reports history of psychotropic management from age 17 but was on "too many medications" and so she stopped before going to college. Reports recently feeling like she has been manic and depressed. Reports periods of elevated mood, inflated self esteem, high energy, racing thoughts, insomnia, high appetite, distractibility, and then goes into severe depressed mood, crying, fatigue, low appetite. Reports feeling unstable. Reports last suicidal ideation and self-injurious behaviors being last week. Reports current passive suicidal ideation of wanting "that bad part of her to be dead." Reports chronic anxiety Denies psychotic symptoms. Reports daily marijuana use as means of self medicating. Denies other substance abuse. Reports wanting to restart Lithium however a lower dosage secondary to history of feeling dehydrated on Lithium. Reports liking Wellbutrin for Anxiety (150 - 300 mg). Reports wanting simple psychotropic management as she has a history of being on too many medications at once.                   Course of Hospitalization          The pt appeared to have benefitted from the safety and structure  of the inpatient hospital unit setting with a good clinical response to her subsequently initiated med regimen.   · : The pt. was  seen prior to her discharge home on 8/4/2020.   The pt reported better overall mood and better controlled anxiety  with slowly improving frustration tolerance and impulse control. The pt managed to keep calm over the weekend despite some brief unit chaos unrelated to the pt.. Pt neatly and casually attired with  good grooming and with attention to her ADLs. The pt showed this writer multiple pt art work drawings and the pt continues to write in her journal and finds this helpful.   Pt alert and oriented x 3. In no acute distress. The pt appeared better rested and more appropriately animated and future oriented.  Pt spoke of feeling ' much better' and spoke of her willingness to enter outpatient treatment, with recommendation for dual diagnosis treatment locally at [facility] in [place]  as the pt lives with her family in [place].   	 Pt spoke of her high school graduation and  her near completion of college at [employer] where she has been studying criminal justice. "   	 The pt talked of her interest in becoming a  " because I am a very animated person and I guess that is a good thing for that."   	 The pt briefly noted her relationship with a woman, a relationship which ended due to her SO reported terminal illness. Pt also spoke honestly of her strained relationship with her father and with her 9 yr-old step sister. " I kind of feel resentment towards her because while my father is yelling at me he is hugging her.'   	  We discussed pt current med regimen with plan to keep Lithium dose at hs  and to lower prn Ativan dose with low dose standing dose of Ativan for now in an effort to alleviate the pt's current affective dysregulation while aiding her sleep and decreasing her daytime sedation. Pt amenable to this plan and she was encouraged to join therapy groups as tolerated once she felt rested. Also had brief discussion with the pt as to plan for dual diagnosis treatment which may be placed on hold due to the pt's ongoing low dose and slow taper of Ativan  with plan to decrease dose to 0.5 mg po bid.    The pt continues to tolerate her HS Lithium  and low dose standing Ativan as above. Plan discussed with team and with the pt to check Lithium level, CMP and CBC with diff on 8/4/2020 with plan for pt DC home and with after care treatment plan to include [facility] intake in [place]   for therapy and med management treatment  with ongoing pt encouragement to continue to cut back on her THC use as the pt had already been doing prior to her admission to hospital per pt report.

## 2020-08-04 NOTE — DISCHARGE NOTE BEHAVIORAL HEALTH - NSBHDCDXVALIDYESFT_PSY_A_CORE
Bipolar disorder-current episode mixed, moderate without psychotic features  Cannabis use disorder- severe, with dependence

## 2020-08-04 NOTE — PROGRESS NOTE BEHAVIORAL HEALTH - NSBHCHARTREVIEWVS_PSY_A_CORE FT
Vital Signs Last 24 Hrs  T(C): 36.8 (02 Aug 2020 08:20), Max: 36.8 (02 Aug 2020 08:20)  T(F): 98.2 (02 Aug 2020 08:20), Max: 98.2 (02 Aug 2020 08:20)    RR: 12 (02 Aug 2020 08:20) (12 - 12)  SpO2: 99% (02 Aug 2020 08:20) (99% - 99%)
Vital Signs Last 24 Hrs  T(C): 36.8 (29 Jul 2020 07:57), Max: 36.8 (29 Jul 2020 07:57)  T(F): 98.2 (29 Jul 2020 07:57), Max: 98.2 (29 Jul 2020 07:57)  HR: --  BP: --  BP(mean): --  RR: 12 (29 Jul 2020 07:57) (12 - 12)  SpO2: 99% (29 Jul 2020 07:57) (99% - 99%)
Vital Signs Last 24 Hrs  T(C): 36.5 (03 Aug 2020 07:55), Max: 36.5 (03 Aug 2020 07:55)  T(F): 97.7 (03 Aug 2020 07:55), Max: 97.7 (03 Aug 2020 07:55)  HR: --  BP: --  BP(mean): --  RR: 12 (03 Aug 2020 07:55) (12 - 12)  SpO2: 100% (03 Aug 2020 07:55) (100% - 100%)
Vital Signs Last 24 Hrs  T(C): 37 (04 Aug 2020 07:30), Max: 37 (04 Aug 2020 07:30)  T(F): 98.6 (04 Aug 2020 07:30), Max: 98.6 (04 Aug 2020 07:30)  HR: --  BP: --  BP(mean): --  RR: 18 (04 Aug 2020 07:30) (18 - 18)  SpO2: 100% (04 Aug 2020 07:30) (100% - 100%)
Vital Signs Last 24 Hrs  T(C): 36.3 (28 Jul 2020 13:03), Max: 36.9 (28 Jul 2020 12:05)  T(F): 97.4 (28 Jul 2020 13:03), Max: 98.5 (28 Jul 2020 12:05)  HR: 75 (28 Jul 2020 12:05) (65 - 91)  BP: 106/64 (28 Jul 2020 12:05) (104/57 - 121/76)  BP(mean): 84 (28 Jul 2020 06:45) (67 - 84)  RR: 14 (28 Jul 2020 13:03) (14 - 18)  SpO2: 99% (28 Jul 2020 13:03) (99% - 100%)
Vital Signs Last 24 Hrs  T(C): 36.8 (31 Jul 2020 08:55), Max: 36.8 (31 Jul 2020 08:55)  T(F): 98.3 (31 Jul 2020 08:55), Max: 98.3 (31 Jul 2020 08:55)  HR: --  BP: --  BP(mean): --  RR: 14 (31 Jul 2020 08:55) (14 - 14)  SpO2: 100% (31 Jul 2020 08:55) (100% - 100%)
Vital Signs Last 24 Hrs  T(C): 37.3 (01 Aug 2020 07:57), Max: 37.3 (01 Aug 2020 07:57)  T(F): 99.2 (01 Aug 2020 07:57), Max: 99.2 (01 Aug 2020 07:57)    RR: 18 (01 Aug 2020 07:57) (18 - 18)  SpO2: 100% (01 Aug 2020 07:57) (100% - 100%)
Vital Signs Last 24 Hrs  T(C): 37.1 (30 Jul 2020 08:00), Max: 37.1 (30 Jul 2020 08:00)  T(F): 98.8 (30 Jul 2020 08:00), Max: 98.8 (30 Jul 2020 08:00)  HR: --  BP: --  BP(mean): --  RR: 18 (30 Jul 2020 08:00) (18 - 18)  SpO2: 100% (30 Jul 2020 08:00) (100% - 100%)

## 2020-08-04 NOTE — PROGRESS NOTE BEHAVIORAL HEALTH - THOUGHT PROCESS
Linear/Overinclusive/Circumstantial/Impaired reasoning/Tangential
Impaired reasoning/Overinclusive/Circumstantial/Linear/Tangential
Linear
Linear/Circumstantial/Tangential/Overinclusive/Impaired reasoning
Linear
Impaired reasoning/Linear

## 2020-08-04 NOTE — PROGRESS NOTE BEHAVIORAL HEALTH - NSBHPTASSESSDT_PSY_A_CORE
01-Aug-2020 10:54
04-Aug-2020
30-Jul-2020
02-Aug-2020 12:19
31-Jul-2020
29-Jul-2020 14:13
03-Aug-2020
28-Jul-2020 14:32

## 2020-08-04 NOTE — DISCHARGE NOTE BEHAVIORAL HEALTH - NSBHDCCRISISPLAN1FT_PSY_A_CORE
Advised to return to hospital or go to nearest ED or call 911 or [phone] or [phone] hotlines for any severe, worsening or persistent symptoms including suicidal/homicidal ideations, intent or plans. Patient verbalized understanding of instructions.

## 2020-08-04 NOTE — PROGRESS NOTE BEHAVIORAL HEALTH - NSBHADMITMEDEDUDETAILS_A_CORE FT
Informed consent discussion begun with the pt as to a trial of Lithium for treatment of her bipolar d/o.
Informed consent discussion begun with the pt as to a trial of Lithium for treatment of her bipolar d/o.
Discussed risks/benefits/s-e
Informed consent discussion begun with the pt as to a trial of Lithium for treatment of her bipolar d/o.
Informed consent discussion begun with the pt as to a trial of Lithium for treatment of her bipolar d/o.
Discussed risks/benefits/s-e

## 2020-08-04 NOTE — DISCHARGE NOTE BEHAVIORAL HEALTH - NSBHDCLABSFT_PSY_A_CORE
Fasting Lipids, HgA1C , TSH q 6 monthly  with SGA Seroquel and with Lithium  Lithium level, CMP q 3-4 monthly to monitor pt Lithium level  Monthly monitoring of vital signs including weight and waist circumference with SGA Seroquel

## 2020-08-04 NOTE — PROGRESS NOTE BEHAVIORAL HEALTH - NSBHCHARTREVIEWLAB_PSY_A_CORE FT
12.6   6.50  )-----------( 318      ( 27 Jul 2020 14:12 )             37.9   07-27    142  |  109<H>  |  9   ----------------------------<  85  4.0   |  28  |  0.80    Ca    8.7      27 Jul 2020 14:12    TPro  6.8  /  Alb  3.6  /  TBili  0.7  /  DBili  x   /  AST  9<L>  /  ALT  19  /  AlkPhos  58  07-27
12.6   6.50  )-----------( 318      ( 27 Jul 2020 14:12 )             37.9   07-27    142  |  109<H>  |  9   ----------------------------<  85  4.0   |  28  |  0.80    Ca    8.7      27 Jul 2020 14:12    TPro  6.8  /  Alb  3.6  /  TBili  0.7  /  DBili  x   /  AST  9<L>  /  ALT  19  /  AlkPhos  58  07-27
CBC Full  -  ( 04 Aug 2020 11:05 )  WBC Count : 10.55 K/uL  RBC Count : 4.42 M/uL  Hemoglobin : 13.8 g/dL  Hematocrit : 42.8 %  Platelet Count - Automated : 316 K/uL  Mean Cell Volume : 96.8 fl  Mean Cell Hemoglobin : 31.2 pg  Mean Cell Hemoglobin Concentration : 32.2 gm/dL  Auto Neutrophil # : 7.47 K/uL  Auto Lymphocyte # : 2.25 K/uL  Auto Monocyte # : 0.46 K/uL  Auto Eosinophil # : 0.27 K/uL  Auto Basophil # : 0.07 K/uL  Auto Neutrophil % : 70.7 %  Auto Lymphocyte % : 21.3 %  Auto Monocyte % : 4.4 %  Auto Eosinophil % : 2.6 %  Auto Basophil % : 0.7 %    08-04    138  |  105  |  11  ----------------------------<  73  3.9   |  26  |  0.98    Ca    8.9      04 Aug 2020 11:05    TPro  7.6  /  Alb  4.0  /  TBili  0.6  /  DBili  x   /  AST  11<L>  /  ALT  19  /  AlkPhos  63  08-04
12.6   6.50  )-----------( 318      ( 27 Jul 2020 14:12 )             37.9   07-27    142  |  109<H>  |  9   ----------------------------<  85  4.0   |  28  |  0.80    Ca    8.7      27 Jul 2020 14:12    TPro  6.8  /  Alb  3.6  /  TBili  0.7  /  DBili  x   /  AST  9<L>  /  ALT  19  /  AlkPhos  58  07-27

## 2020-08-04 NOTE — PROGRESS NOTE BEHAVIORAL HEALTH - RISK ASSESSMENT
Low- risk of suicide   Not Psychotic, no active SI or prior SA  Acute risk factors- family tension and stress, ongoing though decreased use of THC   Pandemic interruption of school and other outdoor activities  Chronic risk factors- mother with bipolar d/o, longstanding father -daughter tensions per pt report  h/o inpatient admission at age 18 due to depression and reported suicide gesture  Protective factors- pt is intelligent, creative, planning to complete college , pt with future plans and encouragement and support from her mother   Mitigating factors- pt admitted herself voluntarily to hospital seeking help  Pt amenable to medication treatment of affective lability  Pt able to form therapeutic alliance with staff and with select peers   Pt able to employ coping and problem solving skills  to enhance her safety while in hospital and after pt DC home         No heavy drug abuse hx         Limited insight to her illness
NO changes in risks:   Low- risk of suicide   Not Psychotic, no active SI or prior SA  Acute risk factors- family tension and stress, ongoing though decreased use of THC   Pandemic interruption of school and other outdoor activities  Chronic risk factors- mother with bipolar d/o, longstanding father -daughter tensions per pt report  h/o inpatient admission at age 18 due to depression and reported suicide gesture  Protective factors- pt is intelligent, creative, planning to complete college , pt with future plans and encouragement and support from her mother   Mitigating factors- pt admitted herself voluntarily to hospital seeking help  Pt amenable to medication treatment of affective lability  Pt able to form therapeutic alliance with staff and with select peers   Pt able to employ coping and problem solving skills  to enhance her safety while in hospital and after pt DC home         No heavy drug abuse hx         Limited insight to her illness
Low- risk of suicide   Not Psychotic, no active SI or prior SA  Acute risk factors- family tension and stress, ongoing though decreased use of THC   Pandemic interruption of school and other outdoor activities  Chronic risk factors- mother with bipolar d/o, longstanding father -daughter tensions per pt report  h/o inpatient admission at age 18 due to depression and reported suicide gesture  Protective factors- pt is intelligent, creative, planning to complete college , pt with future plans and encouragement and support from her mother   Mitigating factors- pt admitted herself voluntarily to hospital seeking help  Pt amenable to medication treatment of affective lability  Pt able to form therapeutic alliance with staff and with select peers   Pt able to employ coping and problem solving skills  to enhance her safety while in hospital and after pt DC home         No heavy drug abuse hx         Limited insight to her illness
NO changes in risks:   Low- risk of suicide   Not Psychotic, no active SI or prior SA  Acute risk factors- family tension and stress, ongoing though decreased use of THC   Pandemic interruption of school and other outdoor activities  Chronic risk factors- mother with bipolar d/o, longstanding father -daughter tensions per pt report  h/o inpatient admission at age 18 due to depression and reported suicide gesture  Protective factors- pt is intelligent, creative, planning to complete college , pt with future plans and encouragement and support from her mother   Mitigating factors- pt admitted herself voluntarily to hospital seeking help  Pt amenable to medication treatment of affective lability  Pt able to form therapeutic alliance with staff and with select peers   Pt able to employ coping and problem solving skills  to enhance her safety while in hospital and after pt DC home         No heavy drug abuse hx         Limited insight to her illness
Low-Not Psychotic, no active SI or prior SA         No heavy drug abuse hx         Limited insight to her illness
Low- risk of suicide   Not Psychotic, no active SI or prior SA  Acute risk factors- family tension and stress, ongoing though decreased use of THC   Pandemic interruption of school and other outdoor activities  Chronic risk factors- mother with bipolar d/o, longstanding father -daughter tensions per pt report  h/o inpatient admission at age 18 due to depression and reported suicide gesture  Protective factors- pt is intelligent, creative, planning to complete college , pt with future plans and encouragement and support from her mother   Mitigating factors- pt admitted herself voluntarily to hospital seeking help  Pt amenable to medication treatment of affective lability  Pt able to form therapeutic alliance with staff and with select peers   Pt able to employ coping and problem solving skills  to enhance her safety while in hospital and after pt DC home         No heavy drug abuse hx         Limited insight to her illness
Low- risk of suicide   Not Psychotic, no active SI or prior SA  Acute risk factors- family tension and stress, ongoing though decreased use of THC   Pandemic interruption of school and other outdoor activities  Chronic risk factors- mother with bipolar d/o, longstanding father -daughter tensions per pt report  h/o inpatient admission at age 18 due to depression and reported suicide gesture  Protective factors- pt is intelligent, creative, planning to complete college , pt with future plans and encouragement and support from her mother   Mitigating factors- pt admitted herself voluntarily to hospital seeking help  Pt amenable to medication treatment of affective lability  Pt able to form therapeutic alliance with staff and with select peers   Pt able to employ coping and problem solving skills  to enhance her safety while in hospital and after pt DC home         No heavy drug abuse hx         Limited insight to her illness
Low-Not Psychotic, no active SI or prior SA         No heavy drug abuse hx         Limited insight to her illness

## 2020-08-04 NOTE — PROGRESS NOTE BEHAVIORAL HEALTH - NSBHFUPINTERVALCCFT_PSY_A_CORE
" I feel a lot better. Thank you all for your help."    Lab work 9 8/4/20200  Lithium level= 0.4 mmol/L   BUN/Creatinine= 11/0.98
" I'm  better"
'' I'm feeling manic and I didn't sleep well. I was doing ok for a long while. My mother has bipolar and she told me I could do well on meds for a long time and then suddenly they don't work. I called here and she told me I knew what I needed to do. So I came back to the hospital to get help. She said she was proud of me for doing that.'
"I have been feeling manic and depressed."
" I am OK "
" I'm feeling so much better now. I slept better and I don't feel as tired during the day.'
"I have been feeling manic and depressed."
" I'm doing a lot better . Can I leave today?"

## 2020-08-04 NOTE — DISCHARGE NOTE BEHAVIORAL HEALTH - NSBHDCTESTSFT_PSY_A_CORE
TSH= 1.47  Fasting lipids  Cholesterol = 152  TG= 48  HgA1C= 5.1  COVID -19  PCR NEGATIVE( 7/27/2020)  EKG  VR= 78  NSR with sinus arrythmia TSH= 1.47  Fasting lipids  Cholesterol = 152  TG= 48  HgA1C= 5.1  COVID -19  PCR NEGATIVE( 7/27/2020)  EKG  VR= 78  NSR with sinus arrythmia  Lithium level = 0.4 mmol/L  No studies are pending TSH= 1.47  Fasting lipids  Cholesterol = 152  TG= 48  HgA1C= 5.1  COVID -19  PCR NEGATIVE( 7/27/2020)  EKG  VR= 78  NSR with sinus arrythmia  Lithium level = 0.4 mmol/L  BUN/Creatinine= 11/0.98  No studies are pending

## 2020-08-04 NOTE — PROGRESS NOTE BEHAVIORAL HEALTH - SUMMARY
Patient is a 25 year-old  female, with history of Bipolar, Depression, Anxiety, with history of in-patient hospitalization age 17 at [facility] for manic depressive symptoms, with prior self-injurious behaviors but no suicide attempt, self-referred and presents with manic depressive symptoms.    Patient has hx of Bipolar d/o since age 17, was hospitalized at age 17 at [facility] and was tried on multiple past psychotropics and was annoyed to take so much meds for stability, so she stopped taking them now and now feels that she has needs meds for her mood swings and passive SI, so plans to re-start limited meds for safety/stability.
Patient is a 25 year-old  female, with history of Bipolar, Depression, Anxiety, with history of in-patient hospitalization age 17 at [facility] for manic depressive symptoms, with prior self-injurious behaviors but no suicide attempt, self-referred and presents with manic depressive symptoms.    Patient has hx of Bipolar d/o since age 17, was hospitalized at age 17 at [facility] and was tried on multiple past psychotropics and was annoyed to take so much meds for stability, so she stopped taking them now and now feels that she has needs meds for her mood swings and passive SI, so plans to re-start limited meds for safety/stability.
Patient is a 25 year-old  female, with history of Bipolar, Depression, Anxiety, with history of in-patient hospitalization age 17 at [facility] for manic depressive symptoms, with prior self-injurious behaviors but no suicide attempt, self-referred and presents with manic depressive symptoms.    Patient has hx of Bipolar d/o since age 17, was hospitalized at age 17 at [facility] and was tried on multiple past psychotropics and was annoyed to take so much meds for stability, so she stopped taking them now and now feels that she has needs meds for her mood swings and passive SI, so plans to re-start limited meds for safety/stability.
Patient is a 25 year-old  female, with history of Bipolar, Depression, Anxiety, with history of in-patient hospitalization age 17 at [facility] for manic depressive symptoms, with prior self-injurious behaviors but no suicide attempt, self-referred and presents with manic depressive symptoms.    Patient has hx of Bipolar d/o since age 17, was hospitalized at age 17 at [facility] and was tried on multiple past psychotropics and was annoyed to take so much meds for stability, so she stopped taking them now and now feels that she has needs meds for her mood swings and passive SI, so plans to re-start limited meds for safety/stability.
Patient is a 25 year-old  female, with history of Bipolar, Depression, Anxiety, with history of in-patient hospitalization age 17 at [facility] for manic depressive symptoms, with prior self-injurious behaviors but no suicide attempt, self-referred and presents with manic depressive symptoms.    Patient has hx of Bipolar d/o since age 17, was hospitalized at age 17 at [facility] and was tried on multiple past psychotropics and was annoyed to take so much meds for stability, so she stopped taking them now and now feels that she has needs meds for her mood swings and passive SI, so plans to re-start limited meds for safety/stability.
Patient is a 25 year-old  female, with history of Bipolar, Depression, Anxiety, with history of in-patient hospitalization age 17 at [facility] for manic depressive symptoms, with prior self-injurious behaviors but no suicide attempt, self-referred and presents with manic depressive symptoms.    Patient has hx of Bipolar d/o since age 17, was hospitalized at age 17 at [facility] and was tried on multiple past psychotropics and was annoyed to take so much meds for stability, so she stopped taking them now and now feels that she has needs meds for her mood swings and passive SI, so plans to re-start limited meds for safety/stability.
Patient is a 25 year-old  female, with history of Bipolar, Depression, Anxiety, with history of in-patient hospitalization age 17 at [facility] for manic depressive symptoms, with prior self-injurious behaviors but no suicide attempt, self-referred and presents with manic depressive symptoms.    Patient has hx of Bipolar d/o since age 17, was hospitalized at age 17 at [facility] and was tried on multiple past psychotropics and was annoyed to take so much meds for stability, so she stopped taking them now and now feels that she has needs meds for her mood swings and passive SI, so plans to re-start limited meds for safety/stability.
Patient is a 25 year-old female, with history of Bipolar, Depression, Anxiety, with history of in-patient hospitalization age 17 at [facility] for manic depressive symptoms, with prior self-injurious behaviors but no suicide attempt, self-referred and presents with manic depressive symptoms.    Patient has hx of Bipolar d/o since age 17, was hospitalized at age 17 at [facility] and was tried on multiple past psychotropics and was annoyed to take so much meds for stability, so she stopped taking them now and now feels that she has needs meds for her mood swings and passive SI, so plans to re-start limited meds for safety/stability.    MEDICATIONS  (STANDING):  lithium 600 milliGRAM(s) Oral at bedtime  LORazepam     Tablet 0.5 milliGRAM(s) Oral three times a day  QUEtiapine 150 milliGRAM(s) Oral at bedtime  simethicone 80 milliGRAM(s) Chew three times a day    MEDICATIONS  (PRN):  LORazepam     Tablet 0.5 milliGRAM(s) Oral every 12 hours PRN Anxiety  polyethylene glycol 3350 17 Gram(s) Oral two times a day PRN constipation

## 2020-08-04 NOTE — PROGRESS NOTE BEHAVIORAL HEALTH - PROBLEM SELECTOR PROBLEM 2
Cannabis use, unspecified, uncomplicated

## 2020-08-04 NOTE — DISCHARGE NOTE BEHAVIORAL HEALTH - PROVIDER TOKENS
FREE:[LAST:[tba],FIRST:[tba],PHONE:[(   )    -],FAX:[(   )    -],ADDRESS:[Pt has intake appointment at [facility] on 8/6/2020 at 3:15 pm for referral to individual therapy and med management]]

## 2020-08-04 NOTE — PROGRESS NOTE BEHAVIORAL HEALTH - NSBHFUPINTERVALHXFT_PSY_A_CORE
Pt seen in her room, siting in her bed. fair eye contact. Appetite is satisfying. Sleep si ok. Mood is "better". Pt denies SI, HI, AH, VH, PI.   Cooperative with treatement, no agitation or behavioral  disturbances on the unit.
Pt seen in her room. Pt neatly and casually attired with closely cropped boyish hair style and a few tattoos to her upper arms visible.  Pt alert and oriented x 3. In no acute distress. Good intense eye contact with friendly though serious demeanor. Pt admitted her tendency to become 'angry fast' seemingly in the context of her hypersensitivity to perceived or misperceived criticism  and her possible tendency to over think other people's comments. Pt  with somewhat labile affect  and c/o feeling 'tired' currently. Pt spoke of a h/o poor sleep quality , the pt's stating, " I sleep like a rock but I wake up at the drop of a hat."  Pt spoke of her high school graduation and  her near completion of college at [employer] where she has been studying criminal justice. " But I have changed my major  like 6 times and now I'm thinking of  a career in music. I play the [employer]. " The pt briefly noted her relationship with a woman, a relationship which ended due to her SO reported terminal illness. Pt also spoke honestly of her strained relationship with her father and with her 9 yr-old step sister. " I kind of feel resentment towards her because while my father is yelling at me he is hugging her.'     We discussed pt current med regimen with plan to keep Lithium dose at hs  and to lower prn Ativan dose with low dose standing dose of Ativan for now in an effort to alleviate the pt's current affective dysregulation while aiding her sleep and decreasing her daytime sedation. Pt amenable to this plan and she was encouraged to join therapy groups as tolerated once she felt rested. Also had brief discussion with the pt as to her h/o THC use 9 heavy use of legalized medical THC while in [place] ) but with   a marked decrease in overall use since her return to [place]. We discussed the possibility of worsening mood lability and an increase in anxiety and paranoia with some THC use. Pt agreed to continue with lower dose as above  with goal of no THC still to be encouraged.    The pt presented with rapid speech, near pressured at times, overinclusive and somewhat circumstantial but generally ultimately goal directed.  Memory and cognition appear grossly intact. Pt denies current SI /HI but she remains with impaired judgment and somewhat limited insight though she remains optimistic about treatment and about her future overall. Safety planning discussed and ongoing safety planning reviewed.
Pt seen prior to her discharge home from hospital on 8/4/2020.  The pt reported better overall mood and better controlled anxiety  with slowly improving frustration tolerance and impulse control. The pt managed to keep calm over the weekend despite some brief unit chaos unrelated to the pt.. Pt neatly and casually attired with  good grooming and with attention to her ADLs. The pt showed this writer multiple pt art work drawings and the pt continues to write in her journal and finds this helpful.   Pt alert and oriented x 3. In no acute distress. The pt appeared better rested and more appropriately animated and future oriented.  Pt spoke of feeling ' much better' and spoke of her willingness to enter outpatient treatment, with recommendation for dual diagnosis treatment locally at [facility]  as the pt lives with her family in [place].    Pt spoke of her high school graduation and  her near completion of college at [employer] where she has been studying criminal justice. "    The pt talked of her interest in becoming a  " because I am a very animated person and I guess that is a good thing for that."    The pt briefly noted her relationship with a woman, a relationship which ended due to her SO reported terminal illness. Pt also spoke honestly of her strained relationship with her father and with her 9 yr-old step sister. " I kind of feel resentment towards her because while my father is yelling at me he is hugging her.'     We discussed pt current med regimen with plan to keep Lithium dose at hs  and to lower prn Ativan dose with low dose standing dose of Ativan for now in an effort to alleviate the pt's current affective dysregulation while aiding her sleep and decreasing her daytime sedation. Pt amenable to this plan and she was encouraged to join therapy groups as tolerated once she felt rested. Also had brief discussion with the pt as to plan for dual diagnosis treatment which may be placed on hold due to the pt's ongoing low dose and slow taper of Ativan  with plan to further  decrease dose to 0.5 mg po bid, then q day and DC.    The pt continues to tolerate her HS Lithium  and low dose standing Ativan as above. Plan discussed with team and with the pt to check Lithium level, CMP and CBC with diff on 8/4/2020 with plan for pt DC home and with after care treatment plan to include [facility] intake in [place]   for therapy and med management treatment  with ongoing pt encouragement to continue to cut back on her THC use as the pt had already been doing prior to her admission to hospital per pt report.
Patient is a 25 year-old  female, with history of Bipolar, Depression, Anxiety, with history of in-patient hospitalization age 17 at [facility] for manic depressive symptoms, with prior self-injurious behaviors but no suicide attempt, self-referred and presents with manic depressive symptoms.    Patient is off meds for an unknown period, and has severe mood swings to the extreme, either manic/depressed, she has poor sleep, with fair appetite. She endorsed that when she is manic she is irritated, annoyed easily, poor sleep, sleeps for few hours most of the time. She spends money, multi-task oriented etc. alternate with episodes of laughter/crying spells. She smokes THC daily 2-4 blunts a day, previously smoked 4-10 blunts a day. She denied A/Hm but is always paranoid or suspicious. She came here on her own to re-start meds for stability and safety. She endorsed that she was given Lithium 300 mg with ativan yesterday and was able to have some sleep last night. previously she used to take Risperdal/Abilify/Wellbutrin/Thorazine etc. She has chronic SI but never tried to commit suicide.    Plan: Admit Voluntarily          Start Lithium 300 mg BID and titrate as needed for stability          Start Seroquel 50 mg HS and titrate as needed,          Start Klonopin 0.5 mg BID          Collateral Info          Discharge planning once stable
Causally dressed, well groomed, short haircut, visible on the unit, fair to poor eye contact.   Appetite is satisfying.  Sleep is good. Mood is "OK" and angry. Pt denies SI, HI, AH, VH, PI.   Cooperative with treatement, no agitation or behavioral  disturbances on the unit.
Pt seen in her room after the pt had been noted to be on the phone with a friend on and off for quite some time. . Pt neatly and casually attired with closely cropped boyish hair style and a few tattoos to her upper arms visible.  Pt alert and oriented x 3. In no acute distress. The pt appeared better rested and more appropriately animated and future oriented.  Pt spoke of feeling ' much better' and spoke of her willingness to enter outpatient treatment, with recommendation for dual diagnosis treatment locally at [facility] in [place]  as the pt lives with her family in [place].    Pt spoke of her high school graduation and  her near completion of college at [employer] where she has been studying criminal justice. "    The pt talked of her interest in becoming a  " because I am a very animated person and I guess that is a good thing for that."    The pt briefly noted her relationship with a woman, a relationship which ended due to her SO reported terminal illness. Pt also spoke honestly of her strained relationship with her father and with her 9 yr-old step sister. " I kind of feel resentment towards her because while my father is yelling at me he is hugging her.'     We discussed pt current med regimen with plan to keep Lithium dose at hs  and to lower prn Ativan dose with low dose standing dose of Ativan for now in an effort to alleviate the pt's current affective dysregulation while aiding her sleep and decreasing her daytime sedation. Pt amenable to this plan and she was encouraged to join therapy groups as tolerated once she felt rested. Also had brief discussion with the pt as to her h/o THC use 9 heavy use of legalized medical THC while in [place] ) but with   a marked decrease in overall use since her return to [place]. We discussed the possibility of worsening mood lability and an increase in anxiety and paranoia with some THC use. Pt agreed to continue with lower dose as above  with goal of no THC still to be encouraged.    The pt presented with rapid speech, near pressured at times, overinclusive and somewhat circumstantial but generally ultimately goal directed.  Memory and cognition appear grossly intact. Pt denies current SI /HI but she remains with impaired judgment and somewhat limited insight though she remains optimistic about treatment and about her future overall. Safety planning discussed and ongoing safety planning reviewed.   The pt continues to tolerate her now hs only Lithium  and low dose standing Ativan  without side effects but with clinical efficacy still too early to fully gauge. This writer reviewed with the pt the plan to allow for Lithium therapeutic dosing to occur while the pt could take advantage of the therapeutic activities offered  on the inpatient unit to help the pt to deal with her conceded tendency to ' become very emotional and quickly angry"  when upset or frustrated  by events.  Pt amenable to ongoing treatment. Safety planning reviewed. The pt currently denies SI /HI /AH /VH. The pt's judgment is fair with limited but slowly improving insight.
Patient is a 25 year-old  female, with history of Bipolar, Depression, Anxiety, with history of in-patient hospitalization age 17 at [facility] for manic depressive symptoms, with prior self-injurious behaviors but no suicide attempt, self-referred and presents with manic depressive symptoms.    Patient is off meds for an unknown period, and has severe mood swings to the extreme, either manic/depressed, she has poor sleep, with fair appetite. She endorsed that when she is manic she is irritated, annoyed easily, poor sleep, sleeps for few hours most of the time. She spends money, multi-task oriented etc. alternate with episodes of laughter/crying spells. She smokes THC daily 2-4 blunts a day, previously smoked 4-10 blunts a day. She denied A/Hm but is always paranoid or suspicious. She came here on her own to re-start meds for stability and safety. She endorsed that she was given Lithium 300 mg with ativan yesterday and was able to have some sleep last night. previously she used to take Risperdal/Abilify/Wellbutrin/Thorazine etc. She has chronic SI but never tried to commit suicide.    Plan: Admit Voluntarily          Start Lithium 300 mg BID and titrate as needed for stability          Start Seroquel 50 mg HS and titrate as needed,          Start Klonopin 0.5 mg BID          Collateral Info          Discharge planning once stable    07/29/2020: Patient was approached 4 times today AM, every time she seemed annoyed as she is busy doing some other things e.g. writing, sleeping and thus was not able to see the writer, she took her meds as ordered yesterday, and per RN report she slept OK last night, as writer was not able to talk with patient would not try to increase the dosages of meds for now. To continue Lithium 300 mg BID with Seroquel 50 mg HS. F/U in AM for a better chance tomorrow.
Pt seen in her room  The pt reported better overall mood and better controlled anxiety  with slowly improving frustration tolerance and impulse control. The pt managed to keep calm over the weekend despite some brief unit chaos unrelated to the pt.. Pt neatly and casually attired with  good grooming and with attention to her ADLs. The pt showed this writer multiple pt art work drawings and the pt continues to write in her journal and finds this helpful.   Pt alert and oriented x 3. In no acute distress. The pt appeared better rested and more appropriately animated and future oriented.  Pt spoke of feeling ' much better' and spoke of her willingness to enter outpatient treatment, with recommendation for dual diagnosis treatment locally at [facility]  as the pt lives with her family in [place].    Pt spoke of her high school graduation and  her near completion of college at [employer] where she has been studying criminal justice. "    The pt talked of her interest in becoming a  " because I am a very animated person and I guess that is a good thing for that."    The pt briefly noted her relationship with a woman, a relationship which ended due to her SO reported terminal illness. Pt also spoke honestly of her strained relationship with her father and with her 9 yr-old step sister. " I kind of feel resentment towards her because while my father is yelling at me he is hugging her.'     We discussed pt current med regimen with plan to keep Lithium dose at hs  and to lower prn Ativan dose with low dose standing dose of Ativan for now in an effort to alleviate the pt's current affective dysregulation while aiding her sleep and decreasing her daytime sedation. Pt amenable to this plan and she was encouraged to join therapy groups as tolerated once she felt rested. Also had brief discussion with the pt as to plan for dual diagnosis treatment which may be placed on hold due to the pt's ongoing low dose and slow taper of Ativan  with plan to decrease dose to 0.5 mg po bid.    The pt continues to tolerate her HS Lithium  and low dose standing Ativan as above. Plan discussed with team and with the pt to check Lithium level, CMP and CBC with diff on 8/4/2020 with plan for pt DC home and with after care treatment plan to include [facility] intake in [place]   for therapy and med management treatment  with ongoing pt encouragement to continue to cut back on her THC use as the pt had already been doing prior to her admission to hospital per pt report.
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
## 2020-08-06 DIAGNOSIS — F12.20 CANNABIS DEPENDENCE, UNCOMPLICATED: ICD-10-CM

## 2020-08-06 DIAGNOSIS — F31.62 BIPOLAR DISORDER, CURRENT EPISODE MIXED, MODERATE: ICD-10-CM

## 2020-08-06 DIAGNOSIS — F12.90 CANNABIS USE, UNSPECIFIED, UNCOMPLICATED: ICD-10-CM

## 2021-01-13 NOTE — PATIENT PROFILE BEHAVIORAL HEALTH - CAREGIVER
Physical Therapy  Visit Type: treatment  Precautions:  Medical precautions:  seizure precautions and fall risk; standard precautions.    Lines:     Basic: IV, telemetry and urinary catheter    Complex Lines: bilidrain.      Lines in chart and on patient reviewed, cautions maintained throughout session.  Hearing: no hearing deficits  Vision:     Current vision: wears glasses only for reading  Safety Measures: chair alarm      SUBJECTIVE                                                                                                            Patient agreed to participate in therapy this date.  Patient reporting feeling weak and hungry prior to session.     Collaborated with ALIVIA Sexton prior to session  Patient / Family Goal: return to previous functional status      OBJECTIVE                                                                                                              Level of consciousness: alert    Oriented to person, place, time and situation     Arousal alertness: appropriate responses to stimuli    Affect/Behavior: pleasant and cooperative  Patient activity tolerance: 1 to 2 activity to rest  Functional Communication/Cognition    Overall status:  Within functional limits  Balance    Sitting: Static: supervision, Dynamic: supervision    Standing - Firm Surface - Eyes Open: Static: minimal assist Dynamic: minimal assist  Balance Training Completed    Training Tasks: static standing and dynamic standing    Education Details: patient safety, patient demonstrates understanding and patient requires additional training  Balance Details: Patient required minimal assist x 1 with 2WW for static and dynamic balance during marlen cares.     Bed Mobility:    Rolling left: supervision    Rolling right: supervision      Supine to sit: supervision    Sit to supine: supervision  Training completed:    Tasks: rolling left, rolling right, supine to sit and sit to supine    Education details: patient demonstrates  understanding    Patient required supervision for bed mobility and for line management. Increased time to complete tasks due to fatigue.   Transfers:    Assistive devices: 2-wheeled walker, 1 person and gait belt    Sit to stand: minimal assist  Trial 2: minimal assist    Stand to sit: minimal assist  Trial 2: minimal assist  Training completed:    Tasks: sit to stand, stand to sit and stand pivot    Education details: patient safety, patient demonstrates understanding and patient requires additional training    Patient required min (A) x 1 with 2WW and gait belt. Completed sit to stand x 2, first trial from mattress and second trial from commode. Required education to push from stable surface and avoid pulling up on 2WW.   Gait/Ambulation:     Assistance: minimal assist   Assistive device: 2-wheeled walker, 1 person and gait belt    Distance (ft): 3; 3    Pattern: step to    Type: shuffling    Surface: even and tile  Training Completed:    Tasks: gait training on level surfaces and assistive device use    Education details: patient safety, patient demonstrates understanding and patient requires additional training    Patient required min (A) x 1 for ambulation with 2WW and gait belt. Declining further ambulation following utilizing commode due to fatigue.         Interventions                                                                                                       Seated    Lower Extremity: Left and right: seated hip flexion, knee extensions, heel raises and hip abduction, 10 reps, 1 sets  Training provided: bed mobility training, functional ambulation, transfer training, activity tolerance and HEP training  Patient came to sit EOB and maintained for 5 minutes. Completed sit to stand transfer x 1 and ambulated 3 feet to commode. Patient required minimal assist for marlen cares. Completed sit to stand and ambulated 3 feet back to bed. Patient declining further ambulation due to fatigue and decreased  strength. Patient completed there ex exercises as listed above. Ended session in supine. Review HEP and encouraged patient to continue to work on strengthening LE.   Skilled input: Verbal instruction/cues and tactile instruction/cues  Verbal Consent: Writer verbally educated and received verbal consent for hand placement, positioning of patient, and techniques to be performed today from patient for clothing adjustments for techniques as described above and how they are pertinent to the patient's plan of care.        ASSESSMENT                                                                                                                Impairments: strength, balance deficits, activity tolerance, endurance, safety awareness and pain  Functional Limitations: ambulation, sit to/from stand transfers and stair climbing  Patient seen for PT treatment session. Patient with new positive COVID test on 1/12 therefore no longer candidate for IRP. Patient completed bed mobility with supervision, transfer with min (A) x 1, and short distance ambulation with min (A) x 1. Patient continues to be limited by decreased strength, fatigue, activity tolerance, and pain. Recommend patient discharge to post acute rehab to address impairments listed above and further maximize functional mobility.          Discharge Recommendations  Recommendation for Discharge: PT WI: Post acute therapy   Recommendation Comments: Short distance (2-3 feet) ambulation with 2WW, gait belt, and minimal assist x 1 to commode or wheel chair. If too fatigued, transfer via doug juvencio to recliner chair     PT/OT Mobility Equipment for Discharge: Will continue to monitor for needs, patient has cane and WW from her spouse but they are too tall for her.   PT/OT ADL Equipment for Discharge: Continue to monitor needs  PT Identified Barriers to Discharge: Pain, weakness, balance, safety deficits, and decreased activity tolerance.     Skilled therapy is required to  address these limitations in attempt to maximize the patient's independence.  Progress: slow progress, medical status limitations    End of Session:   Location: in bed  Safety measures: call light within reach, equipment intact, lines intact and bed rails x2  Handoff to: nurse            PLAN                                                                                                                            Suggestions for next session as indicated: Progress ambulation, transfers, and strengthening as tolerated     PT Frequency: 5 days/week  Frequency Comments: M-F  1/13    Interventions: balance, bed mobility, HEP train/position, safety education, patient/family training, strengthening, gait training, energy conservation, endurance training and functional transfer training  Agreement to plan and goals: patient agrees with goals and treatment plan        GOALS:  Review Date: 1/11/2021  Short Term Goals:   STGs to be reviewed 1/11/20:    Pt to be mod assist with bed mobility. Met 1/8  Pt to be Mod. Assist with transfers. Met 1/8  Pt to ambulate 50ft  with 2WW and Mod. Assist  Long Term Goals: (to be met by time of discharge from hospital)  Sit to supine: Patient will complete sit to supine minimal assist.  Status: progressing/ongoing  Supine to sit: Patient will complete supine to sit minimal assist.  Status: progressing/ongoing  Sit to stand: Patient will complete sit to stand transfer with minimal assist.  Status: progressing/ongoing  Stand to sit: Patient will complete stand to sit transfer with minimal assist.  Status: progressing/ongoing  Ambulation (even): Patient will ambulate on even surface for 150 feet with 2-wheeled walker (least restrictive device), minimal assist.   Status: progressing/ongoing  3-4 steps: Patient will ambulate 3-4 steps with using one rail, minimal assist.  Status: revised, this goal modified    Documented in the chart in the following areas: Assessment.         Declines

## 2021-02-01 NOTE — ED PROVIDER NOTE - CPE EDP CARDIAC NORM
Pt 88% on room air     Magaly Carbajal RN  02/01/21 0751
Pt unable to walk due to sob once out of bed     Danette Maguire RN  02/01/21 9781
normal...

## 2021-02-17 ENCOUNTER — EMERGENCY (EMERGENCY)
Facility: HOSPITAL | Age: 26
LOS: 0 days | Discharge: HOME | End: 2021-02-17
Attending: EMERGENCY MEDICINE
Payer: COMMERCIAL

## 2021-02-17 VITALS
OXYGEN SATURATION: 100 % | SYSTOLIC BLOOD PRESSURE: 114 MMHG | TEMPERATURE: 98 F | RESPIRATION RATE: 18 BRPM | DIASTOLIC BLOOD PRESSURE: 80 MMHG | HEART RATE: 97 BPM

## 2021-02-17 VITALS — WEIGHT: 179.9 LBS | HEIGHT: 67 IN

## 2021-02-17 DIAGNOSIS — F31.9 BIPOLAR DISORDER, UNSPECIFIED: ICD-10-CM

## 2021-02-17 DIAGNOSIS — L02.31 CUTANEOUS ABSCESS OF BUTTOCK: ICD-10-CM

## 2021-02-17 DIAGNOSIS — F32.9 MAJOR DEPRESSIVE DISORDER, SINGLE EPISODE, UNSPECIFIED: ICD-10-CM

## 2021-02-17 DIAGNOSIS — F41.9 ANXIETY DISORDER, UNSPECIFIED: ICD-10-CM

## 2021-02-17 LAB
ADD ON TEST-SPECIMEN IN LAB: SIGNIFICANT CHANGE UP
ALBUMIN SERPL ELPH-MCNC: 3.6 G/DL — SIGNIFICANT CHANGE UP (ref 3.3–5)
ALP SERPL-CCNC: 83 U/L — SIGNIFICANT CHANGE UP (ref 40–120)
ALT FLD-CCNC: 27 U/L — SIGNIFICANT CHANGE UP (ref 12–78)
ANION GAP SERPL CALC-SCNC: 4 MMOL/L — LOW (ref 5–17)
AST SERPL-CCNC: 11 U/L — LOW (ref 15–37)
BASOPHILS # BLD AUTO: 0.05 K/UL — SIGNIFICANT CHANGE UP (ref 0–0.2)
BASOPHILS NFR BLD AUTO: 0.5 % — SIGNIFICANT CHANGE UP (ref 0–2)
BILIRUB SERPL-MCNC: 0.4 MG/DL — SIGNIFICANT CHANGE UP (ref 0.2–1.2)
BUN SERPL-MCNC: 12 MG/DL — SIGNIFICANT CHANGE UP (ref 7–23)
CALCIUM SERPL-MCNC: 8.6 MG/DL — SIGNIFICANT CHANGE UP (ref 8.5–10.1)
CHLORIDE SERPL-SCNC: 107 MMOL/L — SIGNIFICANT CHANGE UP (ref 96–108)
CO2 SERPL-SCNC: 25 MMOL/L — SIGNIFICANT CHANGE UP (ref 22–31)
CREAT SERPL-MCNC: 0.76 MG/DL — SIGNIFICANT CHANGE UP (ref 0.5–1.3)
EOSINOPHIL # BLD AUTO: 0.24 K/UL — SIGNIFICANT CHANGE UP (ref 0–0.5)
EOSINOPHIL NFR BLD AUTO: 2.3 % — SIGNIFICANT CHANGE UP (ref 0–6)
GLUCOSE SERPL-MCNC: 77 MG/DL — SIGNIFICANT CHANGE UP (ref 70–99)
HCT VFR BLD CALC: 38.7 % — SIGNIFICANT CHANGE UP (ref 34.5–45)
HGB BLD-MCNC: 13 G/DL — SIGNIFICANT CHANGE UP (ref 11.5–15.5)
IMM GRANULOCYTES NFR BLD AUTO: 0.3 % — SIGNIFICANT CHANGE UP (ref 0–1.5)
LYMPHOCYTES # BLD AUTO: 19.6 % — SIGNIFICANT CHANGE UP (ref 13–44)
LYMPHOCYTES # BLD AUTO: 2.04 K/UL — SIGNIFICANT CHANGE UP (ref 1–3.3)
MCHC RBC-ENTMCNC: 31 PG — SIGNIFICANT CHANGE UP (ref 27–34)
MCHC RBC-ENTMCNC: 33.6 GM/DL — SIGNIFICANT CHANGE UP (ref 32–36)
MCV RBC AUTO: 92.4 FL — SIGNIFICANT CHANGE UP (ref 80–100)
MONOCYTES # BLD AUTO: 0.69 K/UL — SIGNIFICANT CHANGE UP (ref 0–0.9)
MONOCYTES NFR BLD AUTO: 6.6 % — SIGNIFICANT CHANGE UP (ref 2–14)
NEUTROPHILS # BLD AUTO: 7.37 K/UL — SIGNIFICANT CHANGE UP (ref 1.8–7.4)
NEUTROPHILS NFR BLD AUTO: 70.7 % — SIGNIFICANT CHANGE UP (ref 43–77)
PLATELET # BLD AUTO: 344 K/UL — SIGNIFICANT CHANGE UP (ref 150–400)
POTASSIUM SERPL-MCNC: 3.9 MMOL/L — SIGNIFICANT CHANGE UP (ref 3.5–5.3)
POTASSIUM SERPL-SCNC: 3.9 MMOL/L — SIGNIFICANT CHANGE UP (ref 3.5–5.3)
PROT SERPL-MCNC: 7.6 GM/DL — SIGNIFICANT CHANGE UP (ref 6–8.3)
RBC # BLD: 4.19 M/UL — SIGNIFICANT CHANGE UP (ref 3.8–5.2)
RBC # FLD: 11.5 % — SIGNIFICANT CHANGE UP (ref 10.3–14.5)
SODIUM SERPL-SCNC: 136 MMOL/L — SIGNIFICANT CHANGE UP (ref 135–145)
WBC # BLD: 10.42 K/UL — SIGNIFICANT CHANGE UP (ref 3.8–10.5)
WBC # FLD AUTO: 10.42 K/UL — SIGNIFICANT CHANGE UP (ref 3.8–10.5)

## 2021-02-17 PROCEDURE — 99284 EMERGENCY DEPT VISIT MOD MDM: CPT

## 2021-02-17 PROCEDURE — 84702 CHORIONIC GONADOTROPIN TEST: CPT

## 2021-02-17 PROCEDURE — 80053 COMPREHEN METABOLIC PANEL: CPT

## 2021-02-17 PROCEDURE — 99284 EMERGENCY DEPT VISIT MOD MDM: CPT | Mod: 25

## 2021-02-17 PROCEDURE — 76830 TRANSVAGINAL US NON-OB: CPT

## 2021-02-17 PROCEDURE — 87077 CULTURE AEROBIC IDENTIFY: CPT

## 2021-02-17 PROCEDURE — 76830 TRANSVAGINAL US NON-OB: CPT | Mod: 26

## 2021-02-17 PROCEDURE — 87070 CULTURE OTHR SPECIMN AEROBIC: CPT

## 2021-02-17 PROCEDURE — 10060 I&D ABSCESS SIMPLE/SINGLE: CPT

## 2021-02-17 PROCEDURE — 87040 BLOOD CULTURE FOR BACTERIA: CPT

## 2021-02-17 PROCEDURE — 36415 COLL VENOUS BLD VENIPUNCTURE: CPT

## 2021-02-17 PROCEDURE — 87075 CULTR BACTERIA EXCEPT BLOOD: CPT

## 2021-02-17 PROCEDURE — 85025 COMPLETE CBC W/AUTO DIFF WBC: CPT

## 2021-02-17 RX ORDER — OXYCODONE AND ACETAMINOPHEN 5; 325 MG/1; MG/1
1 TABLET ORAL ONCE
Refills: 0 | Status: DISCONTINUED | OUTPATIENT
Start: 2021-02-17 | End: 2021-02-17

## 2021-02-17 RX ORDER — MORPHINE SULFATE 50 MG/1
4 CAPSULE, EXTENDED RELEASE ORAL ONCE
Refills: 0 | Status: DISCONTINUED | OUTPATIENT
Start: 2021-02-17 | End: 2021-02-17

## 2021-02-17 RX ORDER — CEPHALEXIN 500 MG
1 CAPSULE ORAL
Qty: 21 | Refills: 0
Start: 2021-02-17 | End: 2021-02-23

## 2021-02-17 RX ADMIN — OXYCODONE AND ACETAMINOPHEN 1 TABLET(S): 5; 325 TABLET ORAL at 19:56

## 2021-02-17 NOTE — ED ADULT NURSE NOTE - NSIMPLEMENTINTERV_GEN_ALL_ED
Implemented All Universal Safety Interventions:  [place] to call system. Call bell, personal items and telephone within reach. Instruct patient to call for assistance. Room bathroom lighting operational. Non-slip footwear when patient is off stretcher. Physically safe environment: no spills, clutter or unnecessary equipment. Stretcher in lowest position, wheels locked, appropriate side rails in place.
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
## 2021-02-17 NOTE — ED STATDOCS - PROGRESS NOTE DETAILS
informed by RN that pt's upreg was positive, will cancel CT, made pt aware of results bhcg negative, no IUP seen on US, repeat uch using a different machine which was negative, issue with ucg machine reporting to nursing management.  I&D preformed, pt is only bactrim, will add keflex, culture sent, will d/c home with otupt f/u with PMD pt agreeable to d/c and plan of care, all questions answered, return precautions given  Jessica Chew PA-C

## 2021-02-17 NOTE — ED STATDOCS - PATIENT PORTAL LINK FT
You can access the FollowMyHealth Patient Portal offered by [facility] by registering at the following website: [email or web address]. By joining [facility]’s FollowMyHealth portal, you will also be able to view your health information using other applications (apps) compatible with our system.

## 2021-02-17 NOTE — ED ADULT NURSE NOTE - OBJECTIVE STATEMENT
Patient presents to the ED c/o abscess. Pt first notices cyst x1 month ago, but pain worsened in her tailbone x1 week ago. Yesterday pain began to radiate down to her buttocks and leg. Pt sent from Urgent Care today to obtain imaging to r/o abscess. Took Motrin PTA with no relief.

## 2021-02-17 NOTE — ED STATDOCS - PRINCIPAL DIAGNOSIS
64yo Female with insulin dependent Type 2 DM and breast ca s/p resection in remission who presented with back pain radiating to right leg and MRI results showing a retroperitoneal mass. Pt also incidentally found to have an interval increase in size of abdominal mass. Pelvic MRI is concerning for for infectious/inflammatory process favored over neoplasm. Abscess

## 2021-02-17 NOTE — ED STATDOCS - CLINICAL SUMMARY MEDICAL DECISION MAKING FREE TEXT BOX
Pt with gluteal abscess s/p I&D.  Spurious positive urine pregnancy.  pt received workup for this, however it was discovered after workup that urine was a spurious result.  D/c home with continued antibiotics, f/u for her wound.

## 2021-02-17 NOTE — ED STATDOCS - SKIN, MLM
skin normal color for race, warm, dry and intact. +gluteal cleft abscess, cellulitis, no discharge, no perianal involvement

## 2021-02-23 LAB
CULTURE RESULTS: SIGNIFICANT CHANGE UP
CULTURE RESULTS: SIGNIFICANT CHANGE UP
SPECIMEN SOURCE: SIGNIFICANT CHANGE UP
SPECIMEN SOURCE: SIGNIFICANT CHANGE UP

## 2021-08-24 ENCOUNTER — EMERGENCY (EMERGENCY)
Facility: HOSPITAL | Age: 26
LOS: 1 days | Discharge: ANOTHER HOSPITAL | End: 2021-08-24
Attending: EMERGENCY MEDICINE | Admitting: EMERGENCY MEDICINE
Payer: COMMERCIAL

## 2021-08-24 ENCOUNTER — INPATIENT (INPATIENT)
Facility: HOSPITAL | Age: 26
LOS: 2 days | Discharge: HOME | End: 2021-08-27
Attending: PSYCHIATRY & NEUROLOGY | Admitting: PSYCHIATRY & NEUROLOGY
Payer: COMMERCIAL

## 2021-08-24 VITALS
DIASTOLIC BLOOD PRESSURE: 72 MMHG | HEIGHT: 67 IN | SYSTOLIC BLOOD PRESSURE: 107 MMHG | WEIGHT: 199.96 LBS | TEMPERATURE: 98 F | RESPIRATION RATE: 18 BRPM | HEART RATE: 82 BPM | OXYGEN SATURATION: 98 %

## 2021-08-24 VITALS — HEIGHT: 67 IN | TEMPERATURE: 98 F | WEIGHT: 200.62 LBS

## 2021-08-24 VITALS
DIASTOLIC BLOOD PRESSURE: 74 MMHG | RESPIRATION RATE: 18 BRPM | HEART RATE: 80 BPM | SYSTOLIC BLOOD PRESSURE: 106 MMHG | TEMPERATURE: 99 F | OXYGEN SATURATION: 99 %

## 2021-08-24 DIAGNOSIS — F31.9 BIPOLAR DISORDER, UNSPECIFIED: ICD-10-CM

## 2021-08-24 LAB
ALBUMIN SERPL ELPH-MCNC: 3.2 G/DL — LOW (ref 3.3–5)
ALP SERPL-CCNC: 64 U/L — SIGNIFICANT CHANGE UP (ref 40–120)
ALT FLD-CCNC: 22 U/L — SIGNIFICANT CHANGE UP (ref 12–78)
ANION GAP SERPL CALC-SCNC: 4 MMOL/L — LOW (ref 5–17)
APAP SERPL-MCNC: <2 UG/ML — LOW (ref 10–30)
APPEARANCE UR: ABNORMAL
AST SERPL-CCNC: 17 U/L — SIGNIFICANT CHANGE UP (ref 15–37)
BASOPHILS # BLD AUTO: 0.05 K/UL — SIGNIFICANT CHANGE UP (ref 0–0.2)
BASOPHILS NFR BLD AUTO: 0.8 % — SIGNIFICANT CHANGE UP (ref 0–2)
BILIRUB SERPL-MCNC: 0.7 MG/DL — SIGNIFICANT CHANGE UP (ref 0.2–1.2)
BILIRUB UR-MCNC: NEGATIVE — SIGNIFICANT CHANGE UP
BUN SERPL-MCNC: 9 MG/DL — SIGNIFICANT CHANGE UP (ref 7–23)
CALCIUM SERPL-MCNC: 8.1 MG/DL — LOW (ref 8.5–10.1)
CHLORIDE SERPL-SCNC: 110 MMOL/L — HIGH (ref 96–108)
CO2 SERPL-SCNC: 24 MMOL/L — SIGNIFICANT CHANGE UP (ref 22–31)
COLOR SPEC: YELLOW — SIGNIFICANT CHANGE UP
CREAT SERPL-MCNC: 0.74 MG/DL — SIGNIFICANT CHANGE UP (ref 0.5–1.3)
DIFF PNL FLD: ABNORMAL
EOSINOPHIL # BLD AUTO: 0.32 K/UL — SIGNIFICANT CHANGE UP (ref 0–0.5)
EOSINOPHIL NFR BLD AUTO: 4.9 % — SIGNIFICANT CHANGE UP (ref 0–6)
ETHANOL SERPL-MCNC: <10 MG/DL — SIGNIFICANT CHANGE UP (ref 0–10)
GLUCOSE SERPL-MCNC: 87 MG/DL — SIGNIFICANT CHANGE UP (ref 70–99)
GLUCOSE UR QL: NEGATIVE — SIGNIFICANT CHANGE UP
HCG SERPL-ACNC: <1 MIU/ML — SIGNIFICANT CHANGE UP
HCT VFR BLD CALC: 39.9 % — SIGNIFICANT CHANGE UP (ref 34.5–45)
HGB BLD-MCNC: 13.2 G/DL — SIGNIFICANT CHANGE UP (ref 11.5–15.5)
IMM GRANULOCYTES NFR BLD AUTO: 0.3 % — SIGNIFICANT CHANGE UP (ref 0–1.5)
KETONES UR-MCNC: NEGATIVE — SIGNIFICANT CHANGE UP
LEUKOCYTE ESTERASE UR-ACNC: NEGATIVE — SIGNIFICANT CHANGE UP
LIDOCAIN IGE QN: 68 U/L — LOW (ref 73–393)
LYMPHOCYTES # BLD AUTO: 1.92 K/UL — SIGNIFICANT CHANGE UP (ref 1–3.3)
LYMPHOCYTES # BLD AUTO: 29.5 % — SIGNIFICANT CHANGE UP (ref 13–44)
MCHC RBC-ENTMCNC: 29.9 PG — SIGNIFICANT CHANGE UP (ref 27–34)
MCHC RBC-ENTMCNC: 33.1 GM/DL — SIGNIFICANT CHANGE UP (ref 32–36)
MCV RBC AUTO: 90.5 FL — SIGNIFICANT CHANGE UP (ref 80–100)
MONOCYTES # BLD AUTO: 0.34 K/UL — SIGNIFICANT CHANGE UP (ref 0–0.9)
MONOCYTES NFR BLD AUTO: 5.2 % — SIGNIFICANT CHANGE UP (ref 2–14)
NEUTROPHILS # BLD AUTO: 3.85 K/UL — SIGNIFICANT CHANGE UP (ref 1.8–7.4)
NEUTROPHILS NFR BLD AUTO: 59.3 % — SIGNIFICANT CHANGE UP (ref 43–77)
NITRITE UR-MCNC: NEGATIVE — SIGNIFICANT CHANGE UP
NRBC # BLD: 0 /100 WBCS — SIGNIFICANT CHANGE UP (ref 0–0)
PCP SPEC-MCNC: SIGNIFICANT CHANGE UP
PH UR: 5 — SIGNIFICANT CHANGE UP (ref 5–8)
PLATELET # BLD AUTO: 328 K/UL — SIGNIFICANT CHANGE UP (ref 150–400)
POTASSIUM SERPL-MCNC: 3.9 MMOL/L — SIGNIFICANT CHANGE UP (ref 3.5–5.3)
POTASSIUM SERPL-SCNC: 3.9 MMOL/L — SIGNIFICANT CHANGE UP (ref 3.5–5.3)
PROT SERPL-MCNC: 6.9 G/DL — SIGNIFICANT CHANGE UP (ref 6–8.3)
PROT UR-MCNC: NEGATIVE — SIGNIFICANT CHANGE UP
RBC # BLD: 4.41 M/UL — SIGNIFICANT CHANGE UP (ref 3.8–5.2)
RBC # FLD: 11.7 % — SIGNIFICANT CHANGE UP (ref 10.3–14.5)
SALICYLATES SERPL-MCNC: <1.7 MG/DL — LOW (ref 2.8–20)
SARS-COV-2 RNA SPEC QL NAA+PROBE: SIGNIFICANT CHANGE UP
SODIUM SERPL-SCNC: 138 MMOL/L — SIGNIFICANT CHANGE UP (ref 135–145)
SP GR SPEC: 1.02 — SIGNIFICANT CHANGE UP (ref 1.01–1.02)
UROBILINOGEN FLD QL: NEGATIVE — SIGNIFICANT CHANGE UP
WBC # BLD: 6.5 K/UL — SIGNIFICANT CHANGE UP (ref 3.8–10.5)
WBC # FLD AUTO: 6.5 K/UL — SIGNIFICANT CHANGE UP (ref 3.8–10.5)

## 2021-08-24 PROCEDURE — 96360 HYDRATION IV INFUSION INIT: CPT | Mod: XU

## 2021-08-24 PROCEDURE — 99221 1ST HOSP IP/OBS SF/LOW 40: CPT

## 2021-08-24 PROCEDURE — 99285 EMERGENCY DEPT VISIT HI MDM: CPT

## 2021-08-24 PROCEDURE — 74177 CT ABD & PELVIS W/CONTRAST: CPT | Mod: 26,MA

## 2021-08-24 PROCEDURE — 76830 TRANSVAGINAL US NON-OB: CPT | Mod: 26

## 2021-08-24 PROCEDURE — 84702 CHORIONIC GONADOTROPIN TEST: CPT

## 2021-08-24 PROCEDURE — U0005: CPT

## 2021-08-24 PROCEDURE — 36415 COLL VENOUS BLD VENIPUNCTURE: CPT

## 2021-08-24 PROCEDURE — 76830 TRANSVAGINAL US NON-OB: CPT

## 2021-08-24 PROCEDURE — 80307 DRUG TEST PRSMV CHEM ANLYZR: CPT

## 2021-08-24 PROCEDURE — 83690 ASSAY OF LIPASE: CPT

## 2021-08-24 PROCEDURE — U0003: CPT

## 2021-08-24 PROCEDURE — 87086 URINE CULTURE/COLONY COUNT: CPT

## 2021-08-24 PROCEDURE — 93010 ELECTROCARDIOGRAM REPORT: CPT

## 2021-08-24 PROCEDURE — 85025 COMPLETE CBC W/AUTO DIFF WBC: CPT

## 2021-08-24 PROCEDURE — 74177 CT ABD & PELVIS W/CONTRAST: CPT | Mod: MA

## 2021-08-24 PROCEDURE — 99285 EMERGENCY DEPT VISIT HI MDM: CPT | Mod: 25

## 2021-08-24 PROCEDURE — 80053 COMPREHEN METABOLIC PANEL: CPT

## 2021-08-24 PROCEDURE — 81001 URINALYSIS AUTO W/SCOPE: CPT

## 2021-08-24 PROCEDURE — 93005 ELECTROCARDIOGRAM TRACING: CPT

## 2021-08-24 RX ORDER — TRAZODONE HCL 50 MG
50 TABLET ORAL AT BEDTIME
Refills: 0 | Status: DISCONTINUED | OUTPATIENT
Start: 2021-08-24 | End: 2021-08-27

## 2021-08-24 RX ORDER — SODIUM CHLORIDE 9 MG/ML
1000 INJECTION INTRAMUSCULAR; INTRAVENOUS; SUBCUTANEOUS ONCE
Refills: 0 | Status: COMPLETED | OUTPATIENT
Start: 2021-08-24 | End: 2021-08-24

## 2021-08-24 RX ADMIN — SODIUM CHLORIDE 1000 MILLILITER(S): 9 INJECTION INTRAMUSCULAR; INTRAVENOUS; SUBCUTANEOUS at 11:36

## 2021-08-24 RX ADMIN — Medication 2 MILLIGRAM(S): at 22:26

## 2021-08-24 RX ADMIN — Medication 50 MILLIGRAM(S): at 22:11

## 2021-08-24 RX ADMIN — Medication 0.5 MILLIGRAM(S): at 10:44

## 2021-08-24 RX ADMIN — SODIUM CHLORIDE 1000 MILLILITER(S): 9 INJECTION INTRAMUSCULAR; INTRAVENOUS; SUBCUTANEOUS at 10:36

## 2021-08-24 NOTE — BH INPATIENT PSYCHIATRY ASSESSMENT NOTE - NSBHCHARTREVIEWVS_PSY_A_CORE FT
Vital Signs Last 24 Hrs  T(C): 37.2 (24 Aug 2021 19:44), Max: 37.2 (24 Aug 2021 19:44)  T(F): 98.9 (24 Aug 2021 19:44), Max: 98.9 (24 Aug 2021 19:44)  HR: 80 (24 Aug 2021 19:44) (80 - 82)  BP: 106/74 (24 Aug 2021 19:44) (106/74 - 107/72)  BP(mean): --  RR: 18 (24 Aug 2021 19:44) (18 - 18)  SpO2: 99% (24 Aug 2021 19:44) (98% - 99%)

## 2021-08-24 NOTE — ED BEHAVIORAL HEALTH ASSESSMENT NOTE - RISK ASSESSMENT
Low Acute Suicide Risk chronic risk given history of SA and SIB but she has not acted on these thoughts in many years. She  is treatment seeking, future oriented.

## 2021-08-24 NOTE — ED ADULT NURSE NOTE - OBJECTIVE STATEMENT
Received pt alert and orientated. Pt denies SOB and chest pain. Pt states she just moved from [facility] and needs a psy dr and primary ran out of her meds. Pt also stated she has been having abd pain and constipation with some nausea and vomiting. Pt is able to eat but her stomach is always hurting. Safety/Comfort maintained. Will continue to monitor. Freq rounding performed. Pt is able to move all extremities and ambulate.

## 2021-08-24 NOTE — BH INPATIENT PSYCHIATRY ASSESSMENT NOTE - MSE UNSTRUCTURED FT
Patient is awake and alert. Affect is neutral, speech is fluent, non-pressured. Thought process is logical and coherent. No delusions expressed. Reports "I only  need to be in the hospital for like a day or two, I just need meds." Denies any suicidal ideations. Limited insight.

## 2021-08-24 NOTE — BH INPATIENT PSYCHIATRY ASSESSMENT NOTE - NSBHASSESSSUMMFT_PSY_ALL_CORE
26 year-old with history of bipolar disorder (bipolar 2?) with recent discontinuation symptoms after abruptly stopping wellbutrin and lorazepam, presents   as voluntary admission "to restart meds". HEr own report suggests rapid fluctuations in mood, is requesting "mood stabilizer"  Will give trazodone 50 mg hs for sleep, that has helped in the past  Defer lamotrigine or other mood stabilizer to primary team

## 2021-08-24 NOTE — ED ADULT NURSE NOTE - ED CARDIAC HEART SOUNDS
PRINCIPAL PROCEDURE  Procedure: Cystoscopic insertion of ureteral stent  Findings and Treatment:
normal S1, S2 heard

## 2021-08-24 NOTE — ED PROVIDER NOTE - OBJECTIVE STATEMENT
Pt is a 25 yo female who presents to the ED with multiple medical complaints. PMhx of anxiety, depression, bipolar disorder. Pt reports that she suffers from bipolar disorder and had been on Bupropion and Ativan but that she ran out of these medications 2 weeks ago. She reports that she use to follow up with psych out of [place] but that she moved to [place] several months ago. Prior to leaving [place] she was given a 4 month supply of medication but she reports that she ran into issues establishing care in [place] and ultimately recently moved back home. She reports that she ran out of medication 2 weeks ago. pt also reports that during that time she had her menstrual cycle which lasted 6 days. She states that she normally experiences heavy bleeding but that this cycle was heavy then usual and when she went to place a tampon she noted that she experienced severe lower abdominal and back pain. Pt has since engaged in penetrative intercourse without pain. She further reports that she has suffered from longstanding abdominal discomfort with associated nausea. pt reports that sometimes the nausea is so severe she will induce vomiting to relieve symptoms. She also reports that she alternates from periods of constipation and diarrhea. She has follow up with GI in the past and there was thought that she may be suffering from IBS she never followed up. Pt denies fever, chills, CP, SOB. She does report anxiety and is requesting to speak with psych. Denies SI/HI

## 2021-08-24 NOTE — BH INPATIENT PSYCHIATRY ASSESSMENT NOTE - HPI (INCLUDE ILLNESS QUALITY, SEVERITY, DURATION, TIMING, CONTEXT, MODIFYING FACTORS, ASSOCIATED SIGNS AND SYMPTOMS)
26 y single woman, non caregiver, domiciled with father, employed at [employer], no medical history, with a PPHx of bipolar disorder, 2 prior hospitalizations (most recently [place] 7/2020), remote suicide attempts in high school but none since, and prior SIB, who presents with "rapid cycling mood symptoms" in the setting of medication non-adherence for 2 weeks, presented to [facility] where she requested voluntary admission on referral from her psychotherapist.  patient says that she was admitted about one year ago to [facility], stabilized on lithium, and discharged to [facility] in [facility]. 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 SHe reports that she discontinued bupropion and lorazepam  after running out of meds several weeks ago, had a very hard time getting with what she describes as mood instability and went to the ED seeking voluntary  admission, then transferred to [facility]  26 y single woman, non caregiver, domiciled with father, employed at [employer], no medical history, with a PPHx of bipolar disorder, 2 prior hospitalizations (most recently [place] 7/2020), remote suicide attempts in high school but none since, and prior SIB, who presents with "rapid cycling mood symptoms" in the setting of medication non-adherence for 2 weeks, presented to [facility] where she requested voluntary admission on referral from her psychotherapist.  patient says that she was admitted about one year ago to [facility], stabilized on lithium, and discharged to [facility] in [facility]. 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 SHe reports that she discontinued bupropion and lorazepam  after running out of meds several weeks ago, had a very hard time getting with what she describes as mood instability and went to the ED seeking voluntary  admission. urine tox + for THC, otherwise no evidence of substance misuse. she signed voluntaries and  then was transferred to [facility]

## 2021-08-24 NOTE — ED BEHAVIORAL HEALTH ASSESSMENT NOTE - DESCRIPTION
none COVID screen - patient   was vaccinated with moderna , second dose 5/21/21  got back from [facility] on Saturday of last week.   no covid contacts in last 2 weeks just moved back to [place] , lives with father, works at [employer]

## 2021-08-24 NOTE — ED ADULT TRIAGE NOTE - CHIEF COMPLAINT QUOTE
Patient states she ran out of Bupropion and Ativan two weeks ago.  Patient denies any SI/HI, AH/VH or any other complaints at this time.  Patient requesting to speak to Psychiatry.

## 2021-08-24 NOTE — ED BEHAVIORAL HEALTH ASSESSMENT NOTE - HPI (INCLUDE ILLNESS QUALITY, SEVERITY, DURATION, TIMING, CONTEXT, MODIFYING FACTORS, ASSOCIATED SIGNS AND SYMPTOMS)
26 y single woman, non caregiver, domiciled with father, employed at [employer], no medical history, with a PPHx of bipolar disorder, 2 prior hospitalizations (most recently [place] 7/2020), remote suicide attempts in high school but none since, and prior SIB, who presents with "rapid cycling mood symptoms" in the setting of medication non-adherence for 2 weeks.     patient says that she was admitted about one year ago to [facility], stabilized on lithium, and discharged to [facility] in [facility]. 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 she had been taking lithium but went off of it last year because she says that it wasn't monitored properly but she says "it does wonders for me when it's monitored properly". she says she was on wellbutrin, ativan and suboxone and then decided to move to [place] to be with family. she was given four month supply but says that she wasn't able to follow up in time and then ran out of medication two weeks ago. 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 says that she feels "manicky" since going off wellbutrin, says that hsterrance has "rapid cycling mood symptoms", says she wakes up in one mood, and then a few hours later is in another. says she feels irritable all the time. says that she has racing thoughts. Feels tired all the time but difficulty sleeping. She says that she has suicidal ideation but would never act on these thoughts.     Denies substance use   denies NEGRITA

## 2021-08-25 LAB
CULTURE RESULTS: SIGNIFICANT CHANGE UP
SPECIMEN SOURCE: SIGNIFICANT CHANGE UP

## 2021-08-25 PROCEDURE — 99238 HOSP IP/OBS DSCHRG MGMT 30/<: CPT

## 2021-08-25 RX ORDER — ARIPIPRAZOLE 15 MG/1
5 TABLET ORAL ONCE
Refills: 0 | Status: COMPLETED | OUTPATIENT
Start: 2021-08-25 | End: 2021-08-25

## 2021-08-25 RX ORDER — ARIPIPRAZOLE 15 MG/1
5 TABLET ORAL DAILY
Refills: 0 | Status: DISCONTINUED | OUTPATIENT
Start: 2021-08-26 | End: 2021-08-27

## 2021-08-25 RX ORDER — BUPROPION HYDROCHLORIDE 150 MG/1
300 TABLET, EXTENDED RELEASE ORAL DAILY
Refills: 0 | Status: DISCONTINUED | OUTPATIENT
Start: 2021-08-25 | End: 2021-08-27

## 2021-08-25 RX ORDER — LITHIUM CARBONATE 300 MG/1
450 TABLET, EXTENDED RELEASE ORAL AT BEDTIME
Refills: 0 | Status: DISCONTINUED | OUTPATIENT
Start: 2021-08-25 | End: 2021-08-27

## 2021-08-25 RX ORDER — HYDROXYZINE HCL 10 MG
50 TABLET ORAL EVERY 6 HOURS
Refills: 0 | Status: DISCONTINUED | OUTPATIENT
Start: 2021-08-25 | End: 2021-08-25

## 2021-08-25 RX ORDER — HYDROXYZINE HCL 10 MG
50 TABLET ORAL EVERY 6 HOURS
Refills: 0 | Status: DISCONTINUED | OUTPATIENT
Start: 2021-08-25 | End: 2021-08-27

## 2021-08-25 RX ORDER — ACETAMINOPHEN 500 MG
650 TABLET ORAL EVERY 6 HOURS
Refills: 0 | Status: DISCONTINUED | OUTPATIENT
Start: 2021-08-25 | End: 2021-08-27

## 2021-08-25 RX ADMIN — LITHIUM CARBONATE 450 MILLIGRAM(S): 300 TABLET, EXTENDED RELEASE ORAL at 20:01

## 2021-08-25 RX ADMIN — Medication 2 MILLIGRAM(S): at 20:01

## 2021-08-25 RX ADMIN — Medication 2 MILLIGRAM(S): at 13:13

## 2021-08-25 RX ADMIN — BUPROPION HYDROCHLORIDE 300 MILLIGRAM(S): 150 TABLET, EXTENDED RELEASE ORAL at 11:05

## 2021-08-25 RX ADMIN — ARIPIPRAZOLE 5 MILLIGRAM(S): 15 TABLET ORAL at 15:36

## 2021-08-25 RX ADMIN — Medication 50 MILLIGRAM(S): at 20:01

## 2021-08-25 NOTE — PSYCHIATRIC REHAB INITIAL EVALUATION - NSBHPRRECOMMEND_PSY_ALL_CORE
Writer met with pt to orient her to psychiatric rehabilitation staff and services. Upon approach, pt verbally agreed to meet with writer and was given the unit schedule. Throughout the session, pt was cooperative, and forthcoming with personal information. Pt was admitted to [place] [facility] [id number] on 08/24/2021 due to rapid fluctuations in mood which pt reports is a warning sign to becoming unstable. Pt also reports that pt currently feels fatigue and is in urgent need of medication refill. During the interview pt demonstrated fair insight into her symptoms and treatment at this time. Writer encouraged pt to attend psychiatric rehabilitation groups and engage in treatment. Writer and pt were able to establish a collaborative psychiatric rehabilitation goal. Psychiatric Rehabilitation staff will continue to engage pt daily in order to develop therapeutic rapport. Due to the COVID-19 pandemic, unit structure and activities are re-evaluated on a consistent basis in effort to maintain the safety of patients and staff.

## 2021-08-25 NOTE — BH INPATIENT PSYCHIATRY PROGRESS NOTE - NSBHCHARTREVIEWVS_PSY_A_CORE FT
Vital Signs Last 24 Hrs  T(C): 36.6 (25 Aug 2021 06:12), Max: 37.2 (24 Aug 2021 19:44)  T(F): 97.8 (25 Aug 2021 06:12), Max: 98.9 (24 Aug 2021 19:44)  HR: 80 (24 Aug 2021 19:44) (80 - 82)  BP: 106/74 (24 Aug 2021 19:44) (106/74 - 107/72)  BP(mean): --  RR: 18 (24 Aug 2021 19:44) (18 - 18)  SpO2: 99% (24 Aug 2021 19:44) (98% - 99%) Vital Signs Last 24 Hrs  T(C): 36.6 (08-25-21 @ 06:12), Max: 37.2 (08-24-21 @ 19:44)  T(F): 97.8 (08-25-21 @ 06:12), Max: 98.9 (08-24-21 @ 19:44)  HR: 80 (08-24-21 @ 19:44) (80 - 80)  BP: 106/74 (08-24-21 @ 19:44) (106/74 - 106/74)  BP(mean): --  RR: 18 (08-24-21 @ 19:44) (18 - 18)  SpO2: 99% (08-24-21 @ 19:44) (99% - 99%)    Orthostatic VS  08-25-21 @ 08:09  Lying BP: --/-- HR: --  Sitting BP: 108/60 HR: 89  Standing BP: 100/61 HR: 72  Site: upper left arm  Mode: electronic  Orthostatic VS  08-24-21 @ 22:16  Lying BP: --/-- HR: --  Sitting BP: 99/58 HR: 96  Standing BP: 108/62 HR: 96  Site: --  Mode: --

## 2021-08-25 NOTE — PSYCHIATRIC REHAB INITIAL EVALUATION - NSBHPRTOOLBOX_PSY_ALL_CORE
Art/Entertainment/Exercise/Family support/Friend support/Mindfulness practice/Music/Outdoor activity/Treatment team provider support

## 2021-08-25 NOTE — BH INPATIENT PSYCHIATRY DISCHARGE NOTE - NSBHDCMEDICALFT_PSY_A_CORE
No pertinent medical issues.  At the time of this treatment summary, the patient has not had any acute medical changes during his hospitalization. There have been no medical consultations. Patient was discharge with no cough, SOB, CP, or fever at time of discharge. Vitals WNL.

## 2021-08-25 NOTE — BH INPATIENT PSYCHIATRY PROGRESS NOTE - PRN MEDS
MEDICATIONS  (PRN):   MEDICATIONS  (PRN):  hydrOXYzine hydrochloride 50 milliGRAM(s) Oral every 6 hours PRN anxiety  LORazepam     Tablet 2 milliGRAM(s) Oral every 6 hours PRN anxiety

## 2021-08-25 NOTE — BH INPATIENT PSYCHIATRY PROGRESS NOTE - NSBHFUPINTERVALHXFT_PSY_A_CORE
Patient was seen and evaluated, chart reviewed. Case discussed with nursing team.  On service for this 26 year old AA  female with PPH of Bipolar Disorder,.  Patient is hospitalized with a primary problem of worsening mood and anxiety.  Patient denied SI, savannah on admission. Patient admitted to [facility] on a 9.13 legal status. I have reviewed the initial psychiatric assessment in the electronic medical record, including the history of present illness, past psychiatric history, family/social history (no pertinent changes), and exam, and have confirmed the salient findings dated    8/24/21.  On unit:  Patient is followed up for Bipolar disorder, admitted for depression and and anxiety.  Chart, medications and labs reviewed.  Patient is discussed with nursing staff. No significant overnight issues.    Patient is observed in her room, she is calm, pleasant, approachable and engaging during interview.  Patient describes mood as “I’m anxious.” Patient denies feeling depressed, manic or suicidal “I don’t want to hurt myself I just need to restart my medications.”  She reports that precipitant for her admission is that she ran out of medications 2 weeks ago while in [place],  and started feeling irritable and anxious all the time.  Patient denies HI/AVH, delusions or any other psychotic symptoms.  Reports eating and sleeping has been fair.  She is employed at [employer] and has been working regularly.  More recent days she has been feeling anxious, reports she went to her outpatient clinic at [facility] to get medications but was told she could no longer get services there due to her recent move out of the [facility].  Patient just returned from [place] on 4 days ago and is now residing with father. Patient stated that she then went to the ED for medication refill but was told she would need to admit herself. Reports history of Wellbutrin,  Ativan , and remote hx of Lithium over a yr ago. Stopped taking Lithium because “they weren’t monitoring me right.”   Denies obsessive, intrusive and persistent thoughts or compulsive, ritualistic acts are reported. No hallucinations, delusions, savannah or other symptoms of psychotic process are reported by her.   In regards to substance use, pt denies. States she is on Ativan for anxiety, but rarely takes them because “they make me really drowsy.”  Discussed other medication options for anxiety, pt only willing to restart Wellbutrin at this time.  No behavioral issues on unit. Self care remains fair.  Patient offers no complaints.  Very focused on discharge, patient is encouraged to stay in treatment for observation and medication regimen.

## 2021-08-25 NOTE — BH INPATIENT PSYCHIATRY PROGRESS NOTE - CURRENT MEDICATION
MEDICATIONS  (STANDING):  traZODone 50 milliGRAM(s) Oral at bedtime    MEDICATIONS  (PRN):   MEDICATIONS  (STANDING):  buPROPion XL (24-Hour) 300 milliGRAM(s) Oral daily  traZODone 50 milliGRAM(s) Oral at bedtime    MEDICATIONS  (PRN):  hydrOXYzine hydrochloride 50 milliGRAM(s) Oral every 6 hours PRN anxiety  LORazepam     Tablet 2 milliGRAM(s) Oral every 6 hours PRN anxiety

## 2021-08-25 NOTE — BH INPATIENT PSYCHIATRY PROGRESS NOTE - NSBHASSESSSUMMFT_PSY_ALL_CORE
26 year-old with history of bipolar disorder (bipolar 2?) with recent discontinuation symptoms after abruptly stopping wellbutrin and lorazepam, presents   as voluntary admission "to restart meds". Her own report suggests rapid fluctuations in mood, is requesting "mood stabilizer"  Will give trazodone 50 mg hs for sleep, that has helped in the past  Defer lamotrigine or other mood stabilizer to primary team 26 year-old with history of bipolar disorder (bipolar 2?) with recent discontinuation symptoms after abruptly stopping wellbutrin and lorazepam, presents   as voluntary admission "to restart meds". Her own report suggests rapid fluctuations in mood, is requesting "mood stabilizer"  Will give trazodone 50 mg hs for sleep, that has helped in the past  Defer lamotrigine or other mood stabilizer to primary team    -Start Wellbutrin 300mg daily, pt not willing to start mood stabilizer at this time

## 2021-08-25 NOTE — BH TREATMENT PLAN - NSTXDCOTHRGOAL_PSY_ALL_CORE
Pt will show a reduction of symptoms and engage meaningfully with writer to identify a safe discharge plan.

## 2021-08-25 NOTE — BH PATIENT PROFILE - HOME MEDICATIONS
Keflex 500 mg oral capsule , 1 cap(s) orally 3 times a day   QUEtiapine 50 mg oral tablet , 3 tab(s) orally once a day (at bedtime)  LORazepam 0.5 mg oral tablet , 1 tab(s) orally 2 times a day MDD:MDD= 1 MG  lithium 600 mg oral capsule , 1 cap(s) orally once a day (at bedtime)  hydrOXYzine hydrochloride 25 mg oral tablet , 1 tab(s) orally every 8 hours, As needed, Anxiety

## 2021-08-25 NOTE — BH SOCIAL WORK INITIAL PSYCHOSOCIAL EVALUATION - NSCMSPTSTRENGTHS_PSY_ALL_CORE
Compliance to treatment/Expressive of emotions/Future/goal oriented/Highly motivated for treatment/Physically healthy/Self-reliant/Supportive family

## 2021-08-25 NOTE — BH INPATIENT PSYCHIATRY DISCHARGE NOTE - HPI (INCLUDE ILLNESS QUALITY, SEVERITY, DURATION, TIMING, CONTEXT, MODIFYING FACTORS, ASSOCIATED SIGNS AND SYMPTOMS)
26 y single woman, non caregiver, domiciled with father, employed at [employer], no medical history, with a PPHx of bipolar disorder, 2 prior hospitalizations (most recently [place] 7/2020), remote suicide attempts in high school but none since, and prior SIB, who presents with "rapid cycling mood symptoms" in the setting of medication non-adherence for 2 weeks, presented to [facility] where she requested voluntary admission on referral from her psychotherapist.  patient says that she was admitted about one year ago to [facility], stabilized on lithium, and discharged to [facility] in [facility]. 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 SHe reports that she discontinued bupropion and lorazepam  after running out of meds several weeks ago, had a very hard time getting with what she describes as mood instability and went to the ED seeking voluntary  admission. urine tox + for THC, otherwise no evidence of substance misuse. she signed voluntaries and  then was transferred to [facility]

## 2021-08-25 NOTE — BH INPATIENT PSYCHIATRY DISCHARGE NOTE - NSDCMRMEDTOKEN_GEN_ALL_CORE_FT
hydrOXYzine hydrochloride 25 mg oral tablet: 1 tab(s) orally every 8 hours, As needed, Anxiety  Keflex 500 mg oral capsule: 1 cap(s) orally 3 times a day   lithium 600 mg oral capsule: 1 cap(s) orally once a day (at bedtime)  LORazepam 0.5 mg oral tablet: 1 tab(s) orally 2 times a day MDD:MDD= 1 MG  QUEtiapine 50 mg oral tablet: 3 tab(s) orally once a day (at bedtime)   ARIPiprazole 5 mg oral tablet: 1 tab(s) orally once a day  buPROPion 300 mg/24 hours (XL) oral tablet, extended release: 1 tab(s) orally once a day  lithium 450 mg oral tablet, extended release: 1 tab(s) orally once a day (at bedtime)  traZODone 50 mg oral tablet: 1 tab(s) orally once a day (at bedtime)

## 2021-08-25 NOTE — BH INPATIENT PSYCHIATRY DISCHARGE NOTE - NSDCRECOMMEND_PSY_ALL_CORE
Unrestricted diet/activity/Recommended medical follow-up following discharge... Unrestricted diet/activity/Recommended laboratory tests/other investigations following discharge.../Recommended medical follow-up following discharge...

## 2021-08-25 NOTE — BH TREATMENT PLAN - NSTXDCOTHRINTERSW_PSY_ALL_CORE
SW will provide support, insight, discharge planning, psycho-education, and maintain contact with identified supports.      SW will submit referrals for psychiatrist closer to home, primarily [facility] as it is 2 miles from her home.

## 2021-08-25 NOTE — BH INPATIENT PSYCHIATRY DISCHARGE NOTE - OTHER PAST PSYCHIATRIC HISTORY (INCLUDE DETAILS REGARDING ONSET, COURSE OF ILLNESS, INPATIENT/OUTPATIENT TREATMENT)
Pt is a 25YO single AAF domiciled in a private residence with her father in [place] BIB self for medication refill and "rapid cycling moods" in the context of running out of meds x2wks. PPH Bipolar D/O with 2 other hospitalizations, [facility] in 2020. Per record there is one remote suicide attempt in high school, current suicidal thoughts, no plan or intent, pt stated that she would "never act on the thoughts." Pt denied HIIP/AVH. Pt was seen at [facility] in [place] and stated that she stopped going because it's too far and would like a referral closer to home. Pt endorsed poor sleep and feeling "manicky" with mood swings and intense irritability.   Pt UTOX +cannabis.  [facility] HMO/POS: [id number]

## 2021-08-25 NOTE — BH SOCIAL WORK INITIAL PSYCHOSOCIAL EVALUATION - OTHER PAST PSYCHIATRIC HISTORY (INCLUDE DETAILS REGARDING ONSET, COURSE OF ILLNESS, INPATIENT/OUTPATIENT TREATMENT)
Pt is a 25YO single AAF domiciled in a private residence with her father in [place] BIB self for medication refill and "rapid cycling moods" in the context of running out of meds x2wks. PPH Bipolar D/O with 2 other hospitalizations, [facility] in 2020. Per record there is one remote suicide attempt in high school, current suicidal thoughts, no plan or intent, pt stated that she would "never act on the thoughts." Pt denied HIIP/AVH. Pt was seen at [facility] in [place] and stated that she stopped going because it's too far and would like a referral closer to home. Pt endorsed poor sleep and feeling "manicky" with mood swings and intense irritability.   Pt UTOX +cannabis.  [place] HMO/POS: [id number]

## 2021-08-25 NOTE — BH INPATIENT PSYCHIATRY DISCHARGE NOTE - HOSPITAL COURSE
To be completed Patient admitted with a diagnosis of Bipolar Disorder. On admission interview, patient was anxious, and depressed.  She denied SI/HI/AVH, savannah.  Following a prolonged discussion regarding risks (including but not limited to EPS, weight gain, NMS, TD, metabolic syndrome) and benefits (attenuated psychosis, more organized thought process and behavior), patient agreed to treatment and was restarted on her outpatient medications: Wellbutrin 300mg.  She agreed to begin Abilify 5mg and Lithium 450mg.   Patient had no reported or observed adverse effects, such as akathisia, tremor, EPS. On first day of admission patient submitted 3 day letter, requesting for discharge.    During treatment course she remained compliant with medications, no adverse effects noted.  She has remained in good behavioral control and has not required emergent intramuscular medications or seclusion / restraints.  Patient remained in her room most of the day.  No participation in group therapy or milieu therapy.  On exam today the patient was cooperative and makes fair eye contact. She has been in behavioral control no prn’s required during her hospitalization. Denies suicidal thoughts no plan or intent at this time. Denies HI/AVH, delusions, savannah, or other symptoms of psychotic process are reported at this time.  Patient reports reduction of her anxiety, depression. TP logical/linear. Patient able to identify protective factors at discharge: being with family, going back to work, patient is future-oriented, has strong social and family support, and has a strong sense for family responsibility. Patient was provided with psycho-education regarding importance of compliance with medications.    PROCEDURES AND TREATMENT:  > Group therapy  >Milieu therapy and supportive therapy.  >Psychopharmacologic management.  >Motivational counseling.   The patient was treated to the extent that she allowed and knows that she can return for care at any time.  Patient was discharged with 4 weeks supply of medications. Patient left under no distress.   ADDITIONAL RISK FACTORS CONSIDERED AT TIME OF DISCHARGE:  She  is scheduled for discharge, on 3 day letter,  returning to her private residence to live with family. On suicide risk assessment at the time of discharge, patient is at elevated chronic risk due the following factors: history of psychiatric illness, history on noncompliance, recent inpatient psychiatric admission.  Protective factors include patient denying any anxiety, panic attacks, global insomnia, severe anhedonia or psychotic/manic symptoms. Patient has support social network of family and friends, and residential stability.  Patient denies any history of suicide attempts or self-injurious behavior, with no evidence of self injurious or aggressive behaviors on the unit, no access to firearms, substance abuse.  Patient denies any suicidal ideations, intent, or plan at the time of discharge.    The patient has been instructed that should he develop thoughts of self-harm, suicidal thoughts, homicidal thoughts, aggression, agitation or any other distressing psychiatric symptoms, he should call 911 or go the emergency room. The patient has expressed understanding and is in agreement with the above plan.

## 2021-08-26 LAB
COVID-19 SPIKE DOMAIN AB INTERP: POSITIVE
COVID-19 SPIKE DOMAIN ANTIBODY RESULT: >250 U/ML — HIGH
SARS-COV-2 IGG+IGM SERPL QL IA: >250 U/ML — HIGH
SARS-COV-2 IGG+IGM SERPL QL IA: POSITIVE

## 2021-08-26 PROCEDURE — 99232 SBSQ HOSP IP/OBS MODERATE 35: CPT

## 2021-08-26 RX ORDER — BUPROPION HYDROCHLORIDE 150 MG/1
1 TABLET, EXTENDED RELEASE ORAL
Qty: 30 | Refills: 0
Start: 2021-08-26 | End: 2021-09-24

## 2021-08-26 RX ORDER — TRAZODONE HCL 50 MG
1 TABLET ORAL
Qty: 30 | Refills: 0
Start: 2021-08-26 | End: 2021-09-24

## 2021-08-26 RX ORDER — LITHIUM CARBONATE 300 MG/1
1 TABLET, EXTENDED RELEASE ORAL
Qty: 30 | Refills: 0
Start: 2021-08-26 | End: 2021-09-24

## 2021-08-26 RX ORDER — ARIPIPRAZOLE 15 MG/1
1 TABLET ORAL
Qty: 30 | Refills: 0
Start: 2021-08-26 | End: 2021-09-24

## 2021-08-26 RX ADMIN — ARIPIPRAZOLE 5 MILLIGRAM(S): 15 TABLET ORAL at 10:06

## 2021-08-26 RX ADMIN — Medication 2 MILLIGRAM(S): at 02:52

## 2021-08-26 RX ADMIN — BUPROPION HYDROCHLORIDE 300 MILLIGRAM(S): 150 TABLET, EXTENDED RELEASE ORAL at 10:06

## 2021-08-26 RX ADMIN — Medication 650 MILLIGRAM(S): at 02:53

## 2021-08-26 RX ADMIN — LITHIUM CARBONATE 450 MILLIGRAM(S): 300 TABLET, EXTENDED RELEASE ORAL at 20:26

## 2021-08-26 RX ADMIN — Medication 50 MILLIGRAM(S): at 20:27

## 2021-08-26 RX ADMIN — Medication 50 MILLIGRAM(S): at 21:56

## 2021-08-26 RX ADMIN — Medication 2 MILLIGRAM(S): at 20:26

## 2021-08-26 NOTE — BH INPATIENT PSYCHIATRY PROGRESS NOTE - PRN MEDS
MEDICATIONS  (PRN):  acetaminophen   Tablet .. 650 milliGRAM(s) Oral every 6 hours PRN Temp greater or equal to 38C (100.4F), Mild Pain (1 - 3), Moderate Pain (4 - 6)  hydrOXYzine hydrochloride 50 milliGRAM(s) Oral every 6 hours PRN anxiety  LORazepam     Tablet 2 milliGRAM(s) Oral every 6 hours PRN anxiety

## 2021-08-26 NOTE — CHART NOTE - NSCHARTNOTEFT_GEN_A_CORE
Screening Medical Evaluation  Patient Admitted from: [facility] admitting diagnosis: Bipolar disorder    PAST MEDICAL & SURGICAL HISTORY:  Bipolar disorder    No significant past surgical history          Allergies    No Known Allergies    Intolerances        Social History: no etoh tob idu    FAMILY HISTORY: noncontributory      MEDICATIONS  (STANDING):  ARIPiprazole 5 milliGRAM(s) Oral daily  buPROPion XL (24-Hour) 300 milliGRAM(s) Oral daily  lithium CR (ESKALITH-CR) 450 milliGRAM(s) Oral at bedtime  traZODone 50 milliGRAM(s) Oral at bedtime    MEDICATIONS  (PRN):  acetaminophen   Tablet .. 650 milliGRAM(s) Oral every 6 hours PRN Temp greater or equal to 38C (100.4F), Mild Pain (1 - 3), Moderate Pain (4 - 6)  hydrOXYzine hydrochloride 50 milliGRAM(s) Oral every 6 hours PRN anxiety  LORazepam     Tablet 2 milliGRAM(s) Oral every 6 hours PRN anxiety      Vital Signs Last 24 Hrs  T(C): 36.4 (26 Aug 2021 07:01), Max: 36.6 (25 Aug 2021 19:15)  T(F): 97.6 (26 Aug 2021 07:01), Max: 97.9 (25 Aug 2021 19:15)  HR: 69 (26 Aug 2021 07:39) (69 - 69)  BP: 98/60 (26 Aug 2021 07:39) (98/60 - 98/60)  BP(mean): --  RR: 18 (26 Aug 2021 07:39) (18 - 18)  SpO2: 99% (25 Aug 2021 19:15) (99% - 99%)  CAPILLARY BLOOD GLUCOSE            PHYSICAL EXAM:  GENERAL: NAD, well-developed  HEAD:  Atraumatic, Normocephalic  EYES: EOMI, PERRLA, conjunctiva and sclera clear  NECK: Supple, No JVD  CHEST/LUNG: Clear to auscultation bilaterally; No wheeze  HEART: Regular rate and rhythm; No murmurs, rubs, or gallops  ABDOMEN: Soft, Nontender, Nondistended; Bowel sounds present  EXTREMITIES:  2+ Peripheral Pulses, No clubbing, cyanosis, or edema  PSYCH: AAOx3  NEUROLOGY: non-focal  SKIN: No rashes or lesions    LABS:    Reviewed in sunrise          Assessment and Plan:  26F admitted for bipolar disorder    no medical problems  management per primary team

## 2021-08-26 NOTE — BH INPATIENT PSYCHIATRY PROGRESS NOTE - CURRENT MEDICATION
MEDICATIONS  (STANDING):  ARIPiprazole 5 milliGRAM(s) Oral daily  buPROPion XL (24-Hour) 300 milliGRAM(s) Oral daily  lithium CR (ESKALITH-CR) 450 milliGRAM(s) Oral at bedtime  traZODone 50 milliGRAM(s) Oral at bedtime    MEDICATIONS  (PRN):  acetaminophen   Tablet .. 650 milliGRAM(s) Oral every 6 hours PRN Temp greater or equal to 38C (100.4F), Mild Pain (1 - 3), Moderate Pain (4 - 6)  hydrOXYzine hydrochloride 50 milliGRAM(s) Oral every 6 hours PRN anxiety  LORazepam     Tablet 2 milliGRAM(s) Oral every 6 hours PRN anxiety

## 2021-08-26 NOTE — BH INPATIENT PSYCHIATRY PROGRESS NOTE - NSBHFUPINTERVALHXFT_PSY_A_CORE
Chart, medications and labs reviewed.  Patient is discussed with nursing staff.  Patient remains compliant with all standing medications, no adverse effects noted. Patient is eating and sleeping ok.  Patient is alert, focused on discharge, patient submitted a 3 day letter.  Discussed discharge for tomorrow, pt unwilling to stay past tomorrow.  Patient denies current SI/SIB/HI/AVH.  Denies savannah, or mood disturbances, reports manageable level of anxiety. TP logical/linear.    Patient has consented to starting Abilify 5mg and Lithium 450mg, c/w Wellbutrin 300mg daily.  No behavioral issues on unit. Self care remains fair.  Patient offers no complaints but very focused on discharge.  Patient is encouraged to attend groups. Continue to provide therapeutic support and continue treatment for risk modification.

## 2021-08-26 NOTE — BH INPATIENT PSYCHIATRY PROGRESS NOTE - NSBHMETABOLIC_PSY_ALL_CORE_FT
BMI: BMI (kg/m2): 31.4 (08-24-21 @ 22:16)  HbA1c:   Glucose:   BP: --  Lipid Panel:  BMI: BMI (kg/m2): 31.4 (08-24-21 @ 22:16)  HbA1c:   Glucose:   BP: 98/60 (08-26-21 @ 07:39) (98/60 - 98/60)  Lipid Panel:

## 2021-08-26 NOTE — BH INPATIENT PSYCHIATRY PROGRESS NOTE - NSBHASSESSSUMMFT_PSY_ALL_CORE
26 year-old with history of bipolar disorder (bipolar 2?) with recent discontinuation symptoms after abruptly stopping wellbutrin and lorazepam, presents   as voluntary admission "to restart meds". Her own report suggests rapid fluctuations in mood, is requesting "mood stabilizer"  Will give trazodone 50 mg hs for sleep, that has helped in the past  Defer lamotrigine or other mood stabilizer to primary team    -Start Wellbutrin 300mg daily,  Abilify 5mg daily, and Lithium 450mg qhs  -dc on 8/27, pt submitted a 3 day letter

## 2021-08-26 NOTE — BH INPATIENT PSYCHIATRY PROGRESS NOTE - NSBHCHARTREVIEWVS_PSY_A_CORE FT
Vital Signs Last 24 Hrs  T(C): 36.4 (08-26-21 @ 07:01), Max: 36.6 (08-25-21 @ 19:15)  T(F): 97.6 (08-26-21 @ 07:01), Max: 97.9 (08-25-21 @ 19:15)  HR: --  BP: --  BP(mean): --  RR: 18 (08-25-21 @ 19:15) (18 - 18)  SpO2: 99% (08-25-21 @ 19:15) (99% - 99%)    Orthostatic VS  08-25-21 @ 19:15  Lying BP: --/-- HR: --  Sitting BP: 116/65 HR: 87  Standing BP: 120/70 HR: 89  Site: --  Mode: --  Orthostatic VS  08-25-21 @ 08:09  Lying BP: --/-- HR: --  Sitting BP: 108/60 HR: 89  Standing BP: 100/61 HR: 72  Site: upper left arm  Mode: electronic  Orthostatic VS  08-24-21 @ 22:16  Lying BP: --/-- HR: --  Sitting BP: 99/58 HR: 96  Standing BP: 108/62 HR: 96  Site: --  Mode: --   Vital Signs Last 24 Hrs  T(C): 36.4 (08-26-21 @ 07:01), Max: 36.6 (08-25-21 @ 19:15)  T(F): 97.6 (08-26-21 @ 07:01), Max: 97.9 (08-25-21 @ 19:15)  HR: 69 (08-26-21 @ 07:39) (69 - 69)  BP: 98/60 (08-26-21 @ 07:39) (98/60 - 98/60)  BP(mean): --  RR: 18 (08-25-21 @ 19:15) (18 - 18)  SpO2: 99% (08-25-21 @ 19:15) (99% - 99%)    Orthostatic VS  08-25-21 @ 19:15  Lying BP: --/-- HR: --  Sitting BP: 116/65 HR: 87  Standing BP: 120/70 HR: 89  Site: --  Mode: --  Orthostatic VS  08-25-21 @ 08:09  Lying BP: --/-- HR: --  Sitting BP: 108/60 HR: 89  Standing BP: 100/61 HR: 72  Site: upper left arm  Mode: electronic  Orthostatic VS  08-24-21 @ 22:16  Lying BP: --/-- HR: --  Sitting BP: 99/58 HR: 96  Standing BP: 108/62 HR: 96  Site: --  Mode: --

## 2021-08-27 VITALS — RESPIRATION RATE: 18 BRPM

## 2021-08-27 PROCEDURE — 99238 HOSP IP/OBS DSCHRG MGMT 30/<: CPT

## 2021-08-27 RX ADMIN — ARIPIPRAZOLE 5 MILLIGRAM(S): 15 TABLET ORAL at 08:22

## 2021-08-27 RX ADMIN — BUPROPION HYDROCHLORIDE 300 MILLIGRAM(S): 150 TABLET, EXTENDED RELEASE ORAL at 08:21

## 2021-08-27 NOTE — BH DISCHARGE NOTE NURSING/SOCIAL WORK/PSYCH REHAB - NSCDUDCCRISIS_PSY_A_CORE
[facility]  1 (864) [facility]-WELL ([phone])  Text "WELL" to 84046  Website: www.[employer]/.[facility]iz[facility] 1 (931) 591-JEAI (8084) Website: www.safehorizon.org/.National Suicide Prevention Lifeline 3 (501) 711-5583/.  [facility]  1 (830) LIFENET (869-9171)/.  [facility]  [phone] [place] [address] [phone]   Hours:  Monday through Friday from 9 AM to 3 PM [facility]  1 (013) [facility]-WELL ([phone])  Text "WELL" to 08158  Website: www.[employer]/.Safe Horizons 1 (004) 001-DXXW (1174) Website: www.safehorizon.org/.National Suicide Prevention Lifeline 4 (994) 492-1319/.  [facility]  1 (770) LIFENET (146-0712)/.  [facility]’s Behavioral [facility] Crisis [facility]  [address] [place] [address] [phone]   Hours:  Monday through Friday from 9 AM to 3 PM/.  [facility] [facility] - [facility] Crisis Line  [phone], Option 1

## 2021-08-27 NOTE — BH INPATIENT PSYCHIATRY PROGRESS NOTE - NSBHCHARTREVIEWVS_PSY_A_CORE FT
Vital Signs Last 24 Hrs  T(C): 36.6 (08-27-21 @ 06:43), Max: 37.1 (08-26-21 @ 19:34)  T(F): 97.9 (08-27-21 @ 06:43), Max: 98.7 (08-26-21 @ 19:34)  HR: 69 (08-26-21 @ 07:39) (69 - 69)  BP: 98/60 (08-26-21 @ 07:39) (98/60 - 98/60)  BP(mean): --  RR: 18 (08-26-21 @ 07:39) (18 - 18)  SpO2: --    Orthostatic VS  08-26-21 @ 19:34  Lying BP: --/-- HR: --  Sitting BP: 108/71 HR: 65  Standing BP: 106/72 HR: 63  Site: --  Mode: --  Orthostatic VS  08-25-21 @ 19:15  Lying BP: --/-- HR: --  Sitting BP: 116/65 HR: 87  Standing BP: 120/70 HR: 89  Site: --  Mode: --  Orthostatic VS  08-25-21 @ 08:09  Lying BP: --/-- HR: --  Sitting BP: 108/60 HR: 89  Standing BP: 100/61 HR: 72  Site: upper left arm  Mode: electronic   Vital Signs Last 24 Hrs  T(C): 36.6 (08-27-21 @ 06:43), Max: 37.1 (08-26-21 @ 19:34)  T(F): 97.9 (08-27-21 @ 06:43), Max: 98.7 (08-26-21 @ 19:34)  HR: --  BP: --  BP(mean): --  RR: --  SpO2: --    Orthostatic VS  08-27-21 @ 08:00  Lying BP: --/-- HR: --  Sitting BP: 97/61 HR: 93  Standing BP: --/-- HR: --  Site: upper left arm  Mode: electronic  Orthostatic VS  08-26-21 @ 19:34  Lying BP: --/-- HR: --  Sitting BP: 108/71 HR: 65  Standing BP: 106/72 HR: 63  Site: --  Mode: --  Orthostatic VS  08-25-21 @ 19:15  Lying BP: --/-- HR: --  Sitting BP: 116/65 HR: 87  Standing BP: 120/70 HR: 89  Site: --  Mode: --

## 2021-08-27 NOTE — BH INPATIENT PSYCHIATRY PROGRESS NOTE - NSCGIIMPROVESX_PSY_ALL_CORE
3 = Minimally improved - slightly better with little or no clinically meaningful reduction of symptoms.  Represents very little change in basic clinical status, level of care, or functional capacity.

## 2021-08-27 NOTE — BH DISCHARGE NOTE NURSING/SOCIAL WORK/PSYCH REHAB - DISCHARGE INSTRUCTIONS AFTERCARE APPOINTMENTS
In order to check the location, date, or time of your aftercare appointment, please refer to your Discharge Instructions Document given to you upon leaving the hospital.  If you have lost the instructions please call [phone]

## 2021-08-27 NOTE — BH DISCHARGE NOTE NURSING/SOCIAL WORK/PSYCH REHAB - NSDCPRRECOMMEND_PSY_ALL_CORE
Patient will attend the [facility] outpatient program for ongoing medication management, support, and psychotherapy.

## 2021-08-27 NOTE — BH INPATIENT PSYCHIATRY PROGRESS NOTE - NSBHFUPINTERVALHXFT_PSY_A_CORE
Patient submitted 3 day letter, requesting for discharge.  Patient seen individually for discharge day management. The patient's case has been reviewed with the treatment team.  Spent performing discharge risk assessment, safety planning, performing clinical eval, planning for dc and providing psychoeducation about meds, sx and aftercare.  On exam today the patient was cooperative and makes fair eye contact. She has been in behavioral control no prn’s required during her hospitalization. Denies suicidal thoughts no plan or intent at this time. Denies HI/AVH, delusions, savannah, or other symptoms of psychotic process are reported at this time.  Patient reports reduction of her anxiety, depression. TP logical/linear. She has remained compliant with medications, no adverse effects noted.  She has remained in good behavioral control and has not required emergent intramuscular medications or seclusion / restraints.  Patient able to identify protective factors at discharge: being with family, going back to work, patient is future-oriented, has strong social and family support, and has a strong sense for family responsibility. The patient was treated to the extent that she allowed and knows that she can return for care at any time.  Patient was discharged with 4 weeks supply of medications. Outpatient follow up recommendations has been discussed and arranged by MARGARITO.  Patient left under no distress.

## 2021-08-27 NOTE — BH INPATIENT PSYCHIATRY PROGRESS NOTE - ADDITIONAL DETAILS / COMMENTS
Patient continues to need treatment interventions until manic symptoms are optimally controlled

## 2021-08-27 NOTE — BH DISCHARGE NOTE NURSING/SOCIAL WORK/PSYCH REHAB - PATIENT PORTAL LINK FT
You can access the FollowMyHealth Patient Portal offered by [facility] by registering at the following website: [email or web address]. By joining [facility]’s FollowMyHealth portal, you will also be able to view your health information using other applications (apps) compatible with our system.

## 2021-08-27 NOTE — BH INPATIENT PSYCHIATRY PROGRESS NOTE - NSTXCOPEGOAL_PSY_ALL_CORE
hospice
Identify and utilize 2 coping skills that meet their needs
Identify and utilize 2 coping skills that meet their needs

## 2021-08-27 NOTE — BH DISCHARGE NOTE NURSING/SOCIAL WORK/PSYCH REHAB - NSDCPRGOAL_PSY_ALL_CORE
Writer met with patient to review progress toward rehabilitation goals and assess current functioning. Patient was able to accomplish her goals of identifying coping skills to manage symptoms, such as going to the beach, taking medication as prescribed, and engaging in therapy. Patient was hospitalized due to worsening anxiety and mood fluctuation due to running out of medication. While in the hospital, patient was compliant with her regimen, and reported improvement in mood as well as sleep. Patient was cooperative with staff and was not a behavioral management issue on the unit. Patient was able to engage in safety planning prior to her discharge. Patient verbally agreed to continue to take medication as prescribed and attend outpatient therapy. Patient was provided with a Press Ganey Survey prior to her discharge.

## 2021-08-27 NOTE — BH INPATIENT PSYCHIATRY PROGRESS NOTE - NSBHADMITMEDEDUDETAILS_A_CORE FT
Airway    Date/Time: 9/25/2020 8:58 AM  Performed by: Ryder Edward MD  Authorized by: Ryder Edward MD     Final Airway Type:  Endotracheal airway  Final Endotracheal Airway*:  ETT  ETT Size (mm)*:  7.0  Technique Used for Successful ETT Placement:  Direct laryngoscopy  Intubation Procedure*:  ETCO2, Preoxygenation, Atraumatic and Phaynx Clear  Blade Type*:  MAC  Blade Size*:  3  Measured from*:  Lips  Secured at (cm)*:  20  Placement Verified by: auscultation and capnometry    Glottic View*:  1 - full view of glottis  Attempts*:  1  Number of Other Approaches Attempted:  0   Patient Identified, Procedure confirmed, Emergency equipment available and Safety protocols followed  Urgency:  Elective  Difficult Airway: No    Indications for Airway Management:  Anesthesia  Sedation Level:  Anesthetized  Mask Difficulty Assessment:  1 - vent by mask  Performed By:  Anesthesiologist  Anesthesiologist:  Ryder Edward MD  Start Time:  9/25/2020 8:58 AM        
Risk and benefits discussed

## 2021-08-27 NOTE — BH INPATIENT PSYCHIATRY PROGRESS NOTE - NSCGISEVERILLNESS_PSY_ALL_CORE
4 = Moderately ill – overt symptoms causing noticeable, but modest, functional impairment or distress; symptom level may warrant medication

## 2021-08-27 NOTE — BH INPATIENT PSYCHIATRY PROGRESS NOTE - NSBHINPTBILLING_PSY_ALL_CORE
11702 - Inpatient Moderate Complexity
55336 - Inpatient Moderate Complexity
59584 - Hospital Discharge Day Management; 30 min or less

## 2021-08-27 NOTE — BH INPATIENT PSYCHIATRY PROGRESS NOTE - NSBHMETABOLIC_PSY_ALL_CORE_FT
BMI: BMI (kg/m2): 31.4 (08-24-21 @ 22:16)  HbA1c:   Glucose:   BP: 98/60 (08-26-21 @ 07:39) (98/60 - 98/60)  Lipid Panel:

## 2021-09-29 ENCOUNTER — OUTPATIENT (OUTPATIENT)
Dept: OUTPATIENT SERVICES | Facility: HOSPITAL | Age: 26
LOS: 1 days | Discharge: HOME | End: 2021-09-29
Payer: COMMERCIAL

## 2021-09-30 PROCEDURE — 99215 OFFICE O/P EST HI 40 MIN: CPT | Mod: 95

## 2021-10-18 DIAGNOSIS — F31.9 BIPOLAR DISORDER, UNSPECIFIED: ICD-10-CM

## 2021-12-01 ENCOUNTER — EMERGENCY (EMERGENCY)
Facility: HOSPITAL | Age: 26
LOS: 0 days | Discharge: OTHER INSTITUTION | End: 2021-12-02
Attending: EMERGENCY MEDICINE
Payer: COMMERCIAL

## 2021-12-01 VITALS — WEIGHT: 223.11 LBS | HEIGHT: 67 IN

## 2021-12-01 DIAGNOSIS — F32.9 MAJOR DEPRESSIVE DISORDER, SINGLE EPISODE, UNSPECIFIED: ICD-10-CM

## 2021-12-01 DIAGNOSIS — F41.9 ANXIETY DISORDER, UNSPECIFIED: ICD-10-CM

## 2021-12-01 DIAGNOSIS — F32.A DEPRESSION, UNSPECIFIED: ICD-10-CM

## 2021-12-01 DIAGNOSIS — L05.91 PILONIDAL CYST WITHOUT ABSCESS: ICD-10-CM

## 2021-12-01 LAB
ALBUMIN SERPL ELPH-MCNC: 3.2 G/DL — LOW (ref 3.3–5)
ALP SERPL-CCNC: 72 U/L — SIGNIFICANT CHANGE UP (ref 40–120)
ALT FLD-CCNC: 24 U/L — SIGNIFICANT CHANGE UP (ref 12–78)
ANION GAP SERPL CALC-SCNC: 4 MMOL/L — LOW (ref 5–17)
APAP SERPL-MCNC: <2 UG/ML — LOW (ref 10–30)
APPEARANCE UR: CLEAR — SIGNIFICANT CHANGE UP
AST SERPL-CCNC: 16 U/L — SIGNIFICANT CHANGE UP (ref 15–37)
BASOPHILS # BLD AUTO: 0.06 K/UL — SIGNIFICANT CHANGE UP (ref 0–0.2)
BASOPHILS NFR BLD AUTO: 0.5 % — SIGNIFICANT CHANGE UP (ref 0–2)
BILIRUB SERPL-MCNC: 0.5 MG/DL — SIGNIFICANT CHANGE UP (ref 0.2–1.2)
BILIRUB UR-MCNC: NEGATIVE — SIGNIFICANT CHANGE UP
BUN SERPL-MCNC: 10 MG/DL — SIGNIFICANT CHANGE UP (ref 7–23)
CALCIUM SERPL-MCNC: 8.4 MG/DL — LOW (ref 8.5–10.1)
CHLORIDE SERPL-SCNC: 109 MMOL/L — HIGH (ref 96–108)
CO2 SERPL-SCNC: 25 MMOL/L — SIGNIFICANT CHANGE UP (ref 22–31)
COLOR SPEC: YELLOW — SIGNIFICANT CHANGE UP
CREAT SERPL-MCNC: 0.72 MG/DL — SIGNIFICANT CHANGE UP (ref 0.5–1.3)
DIFF PNL FLD: ABNORMAL
EOSINOPHIL # BLD AUTO: 0.29 K/UL — SIGNIFICANT CHANGE UP (ref 0–0.5)
EOSINOPHIL NFR BLD AUTO: 2.6 % — SIGNIFICANT CHANGE UP (ref 0–6)
ETHANOL SERPL-MCNC: <10 MG/DL — SIGNIFICANT CHANGE UP (ref 0–10)
GLUCOSE SERPL-MCNC: 94 MG/DL — SIGNIFICANT CHANGE UP (ref 70–99)
GLUCOSE UR QL: NEGATIVE MG/DL — SIGNIFICANT CHANGE UP
HCT VFR BLD CALC: 37.9 % — SIGNIFICANT CHANGE UP (ref 34.5–45)
HGB BLD-MCNC: 12.6 G/DL — SIGNIFICANT CHANGE UP (ref 11.5–15.5)
IMM GRANULOCYTES NFR BLD AUTO: 0.2 % — SIGNIFICANT CHANGE UP (ref 0–1.5)
KETONES UR-MCNC: NEGATIVE — SIGNIFICANT CHANGE UP
LEUKOCYTE ESTERASE UR-ACNC: NEGATIVE — SIGNIFICANT CHANGE UP
LITHIUM SERPL-MCNC: <0.2 MMOL/L — LOW (ref 0.6–1.2)
LYMPHOCYTES # BLD AUTO: 18.1 % — SIGNIFICANT CHANGE UP (ref 13–44)
LYMPHOCYTES # BLD AUTO: 2.03 K/UL — SIGNIFICANT CHANGE UP (ref 1–3.3)
MCHC RBC-ENTMCNC: 30.3 PG — SIGNIFICANT CHANGE UP (ref 27–34)
MCHC RBC-ENTMCNC: 33.2 GM/DL — SIGNIFICANT CHANGE UP (ref 32–36)
MCV RBC AUTO: 91.1 FL — SIGNIFICANT CHANGE UP (ref 80–100)
MONOCYTES # BLD AUTO: 0.75 K/UL — SIGNIFICANT CHANGE UP (ref 0–0.9)
MONOCYTES NFR BLD AUTO: 6.7 % — SIGNIFICANT CHANGE UP (ref 2–14)
NEUTROPHILS # BLD AUTO: 8.05 K/UL — HIGH (ref 1.8–7.4)
NEUTROPHILS NFR BLD AUTO: 71.9 % — SIGNIFICANT CHANGE UP (ref 43–77)
NITRITE UR-MCNC: NEGATIVE — SIGNIFICANT CHANGE UP
PCP SPEC-MCNC: SIGNIFICANT CHANGE UP
PH UR: 5 — SIGNIFICANT CHANGE UP (ref 5–8)
PLATELET # BLD AUTO: 315 K/UL — SIGNIFICANT CHANGE UP (ref 150–400)
POTASSIUM SERPL-MCNC: 3.9 MMOL/L — SIGNIFICANT CHANGE UP (ref 3.5–5.3)
POTASSIUM SERPL-SCNC: 3.9 MMOL/L — SIGNIFICANT CHANGE UP (ref 3.5–5.3)
PROT SERPL-MCNC: 7 GM/DL — SIGNIFICANT CHANGE UP (ref 6–8.3)
PROT UR-MCNC: NEGATIVE MG/DL — SIGNIFICANT CHANGE UP
RBC # BLD: 4.16 M/UL — SIGNIFICANT CHANGE UP (ref 3.8–5.2)
RBC # FLD: 11.9 % — SIGNIFICANT CHANGE UP (ref 10.3–14.5)
SALICYLATES SERPL-MCNC: <1.7 MG/DL — LOW (ref 2.8–20)
SODIUM SERPL-SCNC: 138 MMOL/L — SIGNIFICANT CHANGE UP (ref 135–145)
SP GR SPEC: 1.02 — SIGNIFICANT CHANGE UP (ref 1.01–1.02)
UROBILINOGEN FLD QL: NEGATIVE MG/DL — SIGNIFICANT CHANGE UP
WBC # BLD: 11.2 K/UL — HIGH (ref 3.8–10.5)
WBC # FLD AUTO: 11.2 K/UL — HIGH (ref 3.8–10.5)

## 2021-12-01 PROCEDURE — 80307 DRUG TEST PRSMV CHEM ANLYZR: CPT

## 2021-12-01 PROCEDURE — U0003: CPT

## 2021-12-01 PROCEDURE — 81001 URINALYSIS AUTO W/SCOPE: CPT

## 2021-12-01 PROCEDURE — 81025 URINE PREGNANCY TEST: CPT

## 2021-12-01 PROCEDURE — U0005: CPT

## 2021-12-01 PROCEDURE — 87086 URINE CULTURE/COLONY COUNT: CPT

## 2021-12-01 PROCEDURE — 93005 ELECTROCARDIOGRAM TRACING: CPT

## 2021-12-01 PROCEDURE — 90792 PSYCH DIAG EVAL W/MED SRVCS: CPT | Mod: 95

## 2021-12-01 PROCEDURE — 99285 EMERGENCY DEPT VISIT HI MDM: CPT

## 2021-12-01 PROCEDURE — 80178 ASSAY OF LITHIUM: CPT

## 2021-12-01 PROCEDURE — 80053 COMPREHEN METABOLIC PANEL: CPT

## 2021-12-01 PROCEDURE — 36415 COLL VENOUS BLD VENIPUNCTURE: CPT

## 2021-12-01 PROCEDURE — 85025 COMPLETE CBC W/AUTO DIFF WBC: CPT

## 2021-12-01 PROCEDURE — 93010 ELECTROCARDIOGRAM REPORT: CPT

## 2021-12-01 PROCEDURE — 99283 EMERGENCY DEPT VISIT LOW MDM: CPT | Mod: 25

## 2021-12-01 RX ORDER — OXYCODONE HYDROCHLORIDE 5 MG/1
5 TABLET ORAL ONCE
Refills: 0 | Status: DISCONTINUED | OUTPATIENT
Start: 2021-12-01 | End: 2021-12-01

## 2021-12-01 RX ADMIN — Medication 1 MILLIGRAM(S): at 20:24

## 2021-12-01 NOTE — ED PROVIDER NOTE - CLINICAL SUMMARY MEDICAL DECISION MAKING FREE TEXT BOX
Anxiolysis. Labs. Examination of potential pilonidal cyst. psych evaluation. Anxiolysis. Labs. psych evaluation. Signed out the care of the patient to Dr. Nixon. please refer to follow up discussion/findings.

## 2021-12-01 NOTE — ED BEHAVIORAL HEALTH ASSESSMENT NOTE - DETAILS
deferred self referred SA as teenager in HS but none since then; current passive suicidal ideation, denies plan or intent pending mom with bipolar disorder hydroxyzine- tired, wellbutrin - weight gain

## 2021-12-01 NOTE — ED BEHAVIORAL HEALTH ASSESSMENT NOTE - OTHER PAST PSYCHIATRIC HISTORY (INCLUDE DETAILS REGARDING ONSET, COURSE OF ILLNESS, INPATIENT/OUTPATIENT TREATMENT)
3 prior hospitalization  most recently [facility], August, 2021  no current outpatient treatment

## 2021-12-01 NOTE — ED BEHAVIORAL HEALTH ASSESSMENT NOTE - RISK ASSESSMENT
Low Acute Suicide Risk chronic risk given history of SA and SIB but she has not acted on these thoughts in many years and while is has passive suicidal ideation, she denies plan or intent.  She  is treatment seeking, future oriented.

## 2021-12-01 NOTE — ED ADULT NURSE NOTE - OBJECTIVE STATEMENT
Pt presented to the ER with c/o needing a psych eval, pt was recently seen at [facility]. Pt was started on new medication and was D/C without any follow up resources. Pt took herself off the medication due to the side effects she was feeling. Pt feels that her Bipolar is out of control at this time. Pt is also c/o of a cyst on her tailbone that she has had for several years, pt does not want any intervention unless she is "put out". Pt denies any chest pain, SI, or HI at this time.

## 2021-12-01 NOTE — ED ADULT TRIAGE NOTE - CHIEF COMPLAINT QUOTE
Pt presents to er with complaints of needing a psyche eval, states she is Bipolar disorder/anxiety/depression, pt states she took herself off all meds last month, denies SI/HI at this time, also wants cyst on sacrum drained at this time.

## 2021-12-01 NOTE — ED BEHAVIORAL HEALTH ASSESSMENT NOTE - SUMMARY
26 y single woman, non caregiver, domiciled with father, employed at [employer], no medical history, with a PPHx of bipolar disorder, 3 prior hospitalizations (most recently [facility] 8/2021), remote suicide attempts in high school but none since, and prior SIB, daily cannabis abuse, denies history of complicated withdrawal, who presents with worsening depression in the context of medication/treatment non-compliance.  Patient presents with worsening depression, anxiety, mood lability, hypersomnia, anergia, amotivation, anhedonia and passive suicidal ideation, denies plan or intent.  Patient with no current outpatient treatment, not on medications and requesting voluntary admission for mood stabilization and to link to after care for on-going monitoring.

## 2021-12-01 NOTE — ED BEHAVIORAL HEALTH ASSESSMENT NOTE - DESCRIPTION
lives with father, works at [employer] none COVID screen - patient   was vaccinated with moderna , second dose 5/19/21  no recent travel in last 2 weeks  no covid contacts in last 2 weeks  Vital Signs Last 24 Hrs  T(C): 37.2 (02 Dec 2021 00:00), Max: 37.2 (02 Dec 2021 00:00)  T(F): 99 (02 Dec 2021 00:00), Max: 99 (02 Dec 2021 00:00)  HR: 90 (02 Dec 2021 00:00) (90 - 106)  BP: 114/65 (02 Dec 2021 00:00) (104/81 - 114/65)  BP(mean): 80 (02 Dec 2021 00:00) (80 - 89)  RR: 16 (02 Dec 2021 00:00) (16 - 18)  SpO2: 100% (02 Dec 2021 00:00) (96% - 100%)

## 2021-12-01 NOTE — ED PROVIDER NOTE - OBJECTIVE STATEMENT
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 Pt reports that she was hospitalized at [facility] in 8/2021 to discuss medication for her anxiety, bipolar disorder and depression. She was prescribed lithium, but ever since has not been able to get in contact with a psychiatrist or a therapist. She has taken herself off of lithium for 1 month because she was not having her lithium levels monitored and she was concerned for possible health risks. Since coming off of lithium her psychiatric sx have worsened. Also pt c/o cyst at tip of tailbone. She states that it has returned 6 times since it was first lanced at [facility] one year ago. It has been lanced 3 times in total. States that she refused to have it lanced again and wants it to be surgically removed. Denies h/o schizophrenia. No other injuries or complaints.

## 2021-12-01 NOTE — ED PROVIDER NOTE - NEUROLOGICAL, MLM
Alert and oriented, no focal deficits, no motor or sensory deficits. Alert and oriented, no focal deficits, no motor or sensory deficits. CNs 2-12 intact. NIH=0 GCS=15

## 2021-12-01 NOTE — ED PROVIDER NOTE - NS_ ATTENDINGSCRIBEDETAILS _ED_A_ED_FT
I Jake Herbert MD saw and examined the patient. Scribe documented for me and under my supervision. I have modified the scribe's documentation where necessary to reflect my history, physical exam and other relevant documentations pertinent to the care of the patient.

## 2021-12-01 NOTE — ED BEHAVIORAL HEALTH ASSESSMENT NOTE - CASE SUMMARY
25 yo F with hx of bipolar disorder presenting with depressive symptoms requesting admission for stabilization.  Will plan for voluntary admission. 25 yo F with hx of bipolar disorder presenting with depressive symptoms requesting admission for stabilization.  Will plan for voluntary admission.  Discussed case with ER attending who recommends augmentin 875/125 mg BID x7 days for pilonidal cyst with Motrin PRN for pain management.

## 2021-12-01 NOTE — ED PROVIDER NOTE - PLAN OF CARE
Bipolar depression, used to be on lithium stopped, signed out the care of the patient to Dr. Nixon pending telepsych consultation, updated patient with results at the time of transfer of care

## 2021-12-01 NOTE — ED PROVIDER NOTE - SKIN, MLM
Skin normal color for race, warm, dry and intact. No evidence of rash. deferred evaluation of possible pilonidal cyst.

## 2021-12-01 NOTE — ED BEHAVIORAL HEALTH ASSESSMENT NOTE - HPI (INCLUDE ILLNESS QUALITY, SEVERITY, DURATION, TIMING, CONTEXT, MODIFYING FACTORS, ASSOCIATED SIGNS AND SYMPTOMS)
26 y single woman, non caregiver, domiciled with father, employed at [employer], no medical history, with a PPHx of bipolar disorder, 3 prior hospitalizations (most recently [facility] 8/2021), remote suicide attempts in high school but none since, and prior SIB, daily cannabis abuse, denies history of complicated withdrawal, who presents with worsening depression in the context of medication/treatment non-compliance.    Patient reports that she was admitted about one year ago to [facility], stabilized on lithium, and discharged to [facility] in [facility]. She says that she worked with [employer] until April 2021. She had been taking lithium but went off of it last year because she says that it wasn't monitored properly but she says "it worked really well in , when they were monitoring it better". She says she was on Wellbutrin, ativan and  and then decided to move to [place] to be with family. she was given four month supply but says that she wasn't able to follow up in time and then ran out of medication. She returned to [place] and was admitted to [facility] for manic like symptoms.  She was displeased with the unit and placed a 72 hour letter, discharged with medications.  She reports still  having difficulty with locating after care and now, instead of being "manic, which is my usual, I feel very depressed".  Reports that over the past 2 weeks, she is sleeping 15-16 hours a day, feels hopeless, has "big mood swings", reports that she is  "Crying all the time" and irritable without provocation.  Endorsed passive suicidal ideation, states "I don't want to kill myself but I don't want to live like this".  Endorsed anhedonia, amotivation and anergia.  She denies acute manic symptoms, hallucinations and no overt delusions elicited at this time.    Collateral: dad; refer to  note

## 2021-12-01 NOTE — ED PROVIDER NOTE - CARE PLAN
1 Principal Discharge DX:	Major depression  Goal:	Bipolar depression, used to be on lithium stopped, signed out the care of the patient to Dr. Nixon pending telepsych consultation, updated patient with results at the time of transfer of care

## 2021-12-01 NOTE — ED PROVIDER NOTE - CONSTITUTIONAL, MLM
normal... Well appearing, awake, alert, oriented to person, place, time/situation and in no apparent distress. Well appearing, awake, alert, oriented to person, place, time/situation and in no apparent distress. Nontoxic appearing. AAOx3.

## 2021-12-01 NOTE — ED PROVIDER NOTE - PROGRESS NOTE DETAILS
Laquita CASTILLO: sign out received from Dr. Herbert, pending psych eval. Psych evaluated. patient planned for voluntary admission to [facility] psych. voluntary papers signed. will arrange for transfer.

## 2021-12-02 ENCOUNTER — INPATIENT (INPATIENT)
Facility: HOSPITAL | Age: 26
LOS: 3 days | Discharge: HOME | DRG: 885 | End: 2021-12-06
Attending: PSYCHIATRY & NEUROLOGY | Admitting: PSYCHIATRY & NEUROLOGY
Payer: COMMERCIAL

## 2021-12-02 VITALS
OXYGEN SATURATION: 100 % | SYSTOLIC BLOOD PRESSURE: 107 MMHG | TEMPERATURE: 99 F | RESPIRATION RATE: 16 BRPM | HEART RATE: 94 BPM | DIASTOLIC BLOOD PRESSURE: 68 MMHG

## 2021-12-02 VITALS
RESPIRATION RATE: 20 BRPM | WEIGHT: 220.9 LBS | DIASTOLIC BLOOD PRESSURE: 79 MMHG | TEMPERATURE: 98 F | OXYGEN SATURATION: 100 % | SYSTOLIC BLOOD PRESSURE: 119 MMHG | HEART RATE: 111 BPM

## 2021-12-02 DIAGNOSIS — F12.10 CANNABIS ABUSE, UNCOMPLICATED: ICD-10-CM

## 2021-12-02 DIAGNOSIS — F31.4 BIPOLAR DISORDER, CURRENT EPISODE DEPRESSED, SEVERE, WITHOUT PSYCHOTIC FEATURES: ICD-10-CM

## 2021-12-02 DIAGNOSIS — F12.20 CANNABIS DEPENDENCE, UNCOMPLICATED: ICD-10-CM

## 2021-12-02 LAB
ALBUMIN SERPL ELPH-MCNC: 4 G/DL — SIGNIFICANT CHANGE UP (ref 3.3–5)
ALP SERPL-CCNC: 76 U/L — SIGNIFICANT CHANGE UP (ref 40–120)
ALT FLD-CCNC: 16 U/L — SIGNIFICANT CHANGE UP (ref 10–45)
ANION GAP SERPL CALC-SCNC: 9 MMOL/L — SIGNIFICANT CHANGE UP (ref 5–17)
AST SERPL-CCNC: 16 U/L — SIGNIFICANT CHANGE UP (ref 10–40)
BILIRUB SERPL-MCNC: 0.7 MG/DL — SIGNIFICANT CHANGE UP (ref 0.2–1.2)
BUN SERPL-MCNC: 9 MG/DL — SIGNIFICANT CHANGE UP (ref 7–23)
CALCIUM SERPL-MCNC: 8.8 MG/DL — SIGNIFICANT CHANGE UP (ref 8.4–10.5)
CHLORIDE SERPL-SCNC: 103 MMOL/L — SIGNIFICANT CHANGE UP (ref 96–108)
CO2 SERPL-SCNC: 24 MMOL/L — SIGNIFICANT CHANGE UP (ref 22–31)
CREAT SERPL-MCNC: 0.75 MG/DL — SIGNIFICANT CHANGE UP (ref 0.5–1.3)
GLUCOSE SERPL-MCNC: 98 MG/DL — SIGNIFICANT CHANGE UP (ref 70–99)
GRAM STN FLD: SIGNIFICANT CHANGE UP
HCT VFR BLD CALC: 38.9 % — SIGNIFICANT CHANGE UP (ref 34.5–45)
HGB BLD-MCNC: 12.5 G/DL — SIGNIFICANT CHANGE UP (ref 11.5–15.5)
MCHC RBC-ENTMCNC: 29.8 PG — SIGNIFICANT CHANGE UP (ref 27–34)
MCHC RBC-ENTMCNC: 32.1 GM/DL — SIGNIFICANT CHANGE UP (ref 32–36)
MCV RBC AUTO: 92.8 FL — SIGNIFICANT CHANGE UP (ref 80–100)
NRBC # BLD: 0 /100 WBCS — SIGNIFICANT CHANGE UP (ref 0–0)
PLATELET # BLD AUTO: 347 K/UL — SIGNIFICANT CHANGE UP (ref 150–400)
POTASSIUM SERPL-MCNC: 3.8 MMOL/L — SIGNIFICANT CHANGE UP (ref 3.5–5.3)
POTASSIUM SERPL-SCNC: 3.8 MMOL/L — SIGNIFICANT CHANGE UP (ref 3.5–5.3)
PROT SERPL-MCNC: 7.3 G/DL — SIGNIFICANT CHANGE UP (ref 6–8.3)
RBC # BLD: 4.19 M/UL — SIGNIFICANT CHANGE UP (ref 3.8–5.2)
RBC # FLD: 11.9 % — SIGNIFICANT CHANGE UP (ref 10.3–14.5)
SODIUM SERPL-SCNC: 136 MMOL/L — SIGNIFICANT CHANGE UP (ref 135–145)
SPECIMEN SOURCE: SIGNIFICANT CHANGE UP
WBC # BLD: 11.83 K/UL — HIGH (ref 3.8–10.5)
WBC # FLD AUTO: 11.83 K/UL — HIGH (ref 3.8–10.5)

## 2021-12-02 PROCEDURE — 99223 1ST HOSP IP/OBS HIGH 75: CPT

## 2021-12-02 RX ORDER — LIDOCAINE HCL 20 MG/ML
30 VIAL (ML) INJECTION ONCE
Refills: 0 | Status: DISCONTINUED | OUTPATIENT
Start: 2021-12-02 | End: 2021-12-02

## 2021-12-02 RX ORDER — LITHIUM CARBONATE 300 MG/1
300 TABLET, EXTENDED RELEASE ORAL
Refills: 0 | Status: DISCONTINUED | OUTPATIENT
Start: 2021-12-02 | End: 2021-12-06

## 2021-12-02 RX ORDER — HYDROMORPHONE HYDROCHLORIDE 2 MG/ML
2 INJECTION INTRAMUSCULAR; INTRAVENOUS; SUBCUTANEOUS ONCE
Refills: 0 | Status: DISCONTINUED | OUTPATIENT
Start: 2021-12-02 | End: 2021-12-02

## 2021-12-02 RX ORDER — ACETAMINOPHEN 500 MG
1000 TABLET ORAL ONCE
Refills: 0 | Status: COMPLETED | OUTPATIENT
Start: 2021-12-02 | End: 2021-12-02

## 2021-12-02 RX ORDER — INFLUENZA VIRUS VACCINE 15; 15; 15; 15 UG/.5ML; UG/.5ML; UG/.5ML; UG/.5ML
0.5 SUSPENSION INTRAMUSCULAR ONCE
Refills: 0 | Status: DISCONTINUED | OUTPATIENT
Start: 2021-12-02 | End: 2021-12-06

## 2021-12-02 RX ORDER — LIDOCAINE HCL 20 MG/ML
20 VIAL (ML) INJECTION ONCE
Refills: 0 | Status: DISCONTINUED | OUTPATIENT
Start: 2021-12-02 | End: 2021-12-06

## 2021-12-02 RX ORDER — HALOPERIDOL DECANOATE 100 MG/ML
5 INJECTION INTRAMUSCULAR EVERY 8 HOURS
Refills: 0 | Status: DISCONTINUED | OUTPATIENT
Start: 2021-12-02 | End: 2021-12-06

## 2021-12-02 RX ORDER — IBUPROFEN 200 MG
400 TABLET ORAL EVERY 8 HOURS
Refills: 0 | Status: DISCONTINUED | OUTPATIENT
Start: 2021-12-02 | End: 2021-12-04

## 2021-12-02 RX ADMIN — HYDROMORPHONE HYDROCHLORIDE 2 MILLIGRAM(S): 2 INJECTION INTRAMUSCULAR; INTRAVENOUS; SUBCUTANEOUS at 16:34

## 2021-12-02 RX ADMIN — OXYCODONE HYDROCHLORIDE 5 MILLIGRAM(S): 5 TABLET ORAL at 00:02

## 2021-12-02 RX ADMIN — HYDROMORPHONE HYDROCHLORIDE 2 MILLIGRAM(S): 2 INJECTION INTRAMUSCULAR; INTRAVENOUS; SUBCUTANEOUS at 18:54

## 2021-12-02 RX ADMIN — LITHIUM CARBONATE 300 MILLIGRAM(S): 300 TABLET, EXTENDED RELEASE ORAL at 21:41

## 2021-12-02 RX ADMIN — Medication 1000 MILLIGRAM(S): at 06:58

## 2021-12-02 RX ADMIN — Medication 1 TABLET(S): at 21:46

## 2021-12-02 RX ADMIN — Medication 400 MILLIGRAM(S): at 21:41

## 2021-12-02 RX ADMIN — Medication 400 MILLIGRAM(S): at 22:41

## 2021-12-02 RX ADMIN — Medication 1 TABLET(S): at 00:02

## 2021-12-02 NOTE — BH INPATIENT PSYCHIATRY ASSESSMENT NOTE - NSBHCHARTREVIEWVS_PSY_A_CORE FT
Vital Signs Last 24 Hrs  T(C): 36.7 (12-02-21 @ 11:30), Max: 36.7 (12-02-21 @ 11:30)  T(F): 98.1 (12-02-21 @ 11:30), Max: 98.1 (12-02-21 @ 11:30)  HR: 111 (12-02-21 @ 11:30) (111 - 111)  BP: 119/79 (12-02-21 @ 11:30) (119/79 - 119/79)  BP(mean): --  RR: 20 (12-02-21 @ 11:30) (20 - 20)  SpO2: 100% (12-02-21 @ 11:30) (100% - 100%)     Vital Signs Last 24 Hrs  T(C): 36.5 (12-03-21 @ 09:00), Max: 36.5 (12-03-21 @ 09:00)  T(F): 97.7 (12-03-21 @ 09:00), Max: 97.7 (12-03-21 @ 09:00)  HR: 103 (12-03-21 @ 09:00) (103 - 103)  BP: 117/80 (12-03-21 @ 09:00) (117/80 - 117/80)  BP(mean): --  RR: 18 (12-03-21 @ 09:00) (18 - 18)  SpO2: 100% (12-03-21 @ 09:00) (100% - 100%)

## 2021-12-02 NOTE — BH INPATIENT PSYCHIATRY ASSESSMENT NOTE - NSBHMETABOLIC_PSY_ALL_CORE_FT
BMI:   HbA1c:   Glucose:   BP: 119/79 (12-02-21 @ 11:30) (119/79 - 119/79)  Lipid Panel:  BMI: BMI (kg/m2): 34.6 (12-03-21 @ 15:02)  HbA1c:   Glucose:   BP: 117/80 (12-03-21 @ 09:00) (117/80 - 119/79)  Lipid Panel: Date/Time: 12-03-21 @ 07:55  Cholesterol, Serum: 151  Direct LDL: --  HDL Cholesterol, Serum: 49  Total Cholesterol/HDL Ration Measurement: --  Triglycerides, Serum: 49

## 2021-12-02 NOTE — BH PATIENT PROFILE - FALL HARM RISK - UNIVERSAL INTERVENTIONS
Bed in lowest position, wheels locked, appropriate side rails in place/Call bell, personal items and telephone in reach/Instruct patient to call for assistance before getting out of bed or chair/Non-slip footwear when patient is out of bed/[place] to call system/Physically safe environment - no spills, clutter or unnecessary equipment/Purposeful Proactive Rounding/Room/bathroom lighting operational, light cord in reach
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
## 2021-12-02 NOTE — BH INPATIENT PSYCHIATRY ASSESSMENT NOTE - PAST PSYCHOTROPIC MEDICATION
Risperdal, Celexa, Trazodone, Thorazine, seroquel, topamax, lithium abilify, wellbutrin, zoloft, ativan

## 2021-12-02 NOTE — BH PATIENT PROFILE - NSTOBACCO QUIT READY_GEN_A_CORE_SD
E-scribe request for Gabapentin 300 MG. Please review and e-scribe if applicable. Next Visit Date:  7/26/2019    Health Maintenance   Topic Date Due    Hepatitis B Vaccine (1 of 3 - Risk 3-dose series) 01/30/1986    DTaP/Tdap/Td vaccine (1 - Tdap) 01/30/1986    Cervical cancer screen  10/11/2016    Diabetic retinal exam  11/10/2016    Shingles Vaccine (1 of 2) 01/30/2017    Diabetic foot exam  11/10/2018    Potassium monitoring  04/26/2019    Creatinine monitoring  06/11/2019    Diabetic microalbuminuria test  06/29/2019    Lipid screen  06/29/2019    A1C test (Diabetic or Prediabetic)  07/16/2019    Breast cancer screen  04/11/2020    Colon cancer screen colonoscopy  02/21/2021    Flu vaccine  Completed    Pneumococcal 0-64 years Vaccine  Completed    HIV screen  Completed             (applicable per patient's age: Cancer Screenings, Depression Screening, Fall Risk Screening, Immunizations)    Hemoglobin A1C (%)   Date Value   04/16/2019 10.5   12/28/2018 8.3   10/03/2018 14.0     Microalb/Crt.  Ratio (mcg/mg creat)   Date Value   06/29/2018 CANNOT BE CALCULATED     LDL Cholesterol (mg/dL)   Date Value   06/29/2018 113     LDL Calculated (mg/dL)   Date Value   03/19/2015 110     AST (U/L)   Date Value   04/26/2018 72 (H)     ALT (U/L)   Date Value   04/26/2018 55 (H)     BUN (mg/dL)   Date Value   06/11/2018 5 (L)      (goal A1C is < 7)   (goal LDL is <100) need 30-50% reduction from baseline     BP Readings from Last 3 Encounters:   04/16/19 (!) 142/88   02/20/19 (!) 158/102   12/28/18 136/88    (goal /80)      All Future Testing planned in [facility]:  Lab Frequency Next Occurrence   Hemoglobin A1c Once 07/26/2019       Next Visit Date:  Future Appointments   Date Time Provider Shana Redding   7/26/2019  2:40 PM Lor Preston MD [id number]            Patient Active Problem List:     Allergic rhinitis     Fibromyalgia     Pure hypercholesterolemia     Chronic cholecystitis Gastroesophageal reflux disease with esophagitis     Type 2 diabetes mellitus with complication (HCC)     Diabetic polyneuropathy associated with type 2 diabetes mellitus (HCC)     Chronic left shoulder pain     Essential hypertension not motivated to quit
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
## 2021-12-02 NOTE — ED ADULT NURSE REASSESSMENT NOTE - NS ED NURSE REASSESS COMMENT FT1
Pt received at 0700. Pt AA&Ox3. Pt resting comfortable. Awaiting transfer. No complains at this time.
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
## 2021-12-02 NOTE — BH INPATIENT PSYCHIATRY ASSESSMENT NOTE - RISK ASSESSMENT
Static: hx of mental illness, substance abuse, fam hx of mental illness/substance abuse  Modifiable: current mood sxs, current medical issues, access to treatment/medications  Protective: future oriented, housing, employment, relationship, insight into illness and need for treatment

## 2021-12-02 NOTE — PROCEDURE NOTE - ADDITIONAL PROCEDURE DETAILS
The area was cleaned with Chloraprep. 1% Lidocaine was injected into the area. a 1 cm incision was made over the area of fluctuance. about 20 cc of purulent drainage came out. Two wound cultures sent out to the lab. The cavity of inspected for pus pockets. A strip gauze was left in place. dry gauze and tape were used to cover the wound. Minimal blood loss.

## 2021-12-02 NOTE — ED BEHAVIORAL HEALTH NOTE - BEHAVIORAL HEALTH NOTE
Telepsychiatry Note:    Patient interviewed at [facility] with plan to transfer to [facility] (pt likely to arrive after 8am).  Orders completed and handoff given to [facility] unit RN.  Unit MD to sign part B of the legals.  Full ED BH Assessment is located in MRN [id number].
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
## 2021-12-02 NOTE — BH SOCIAL WORK INITIAL PSYCHOSOCIAL EVALUATION - NSBHCHILDRESP_PSY_ALL_CORE
INTERVAL HPI/OVERNIGHT EVENTS:  No new overnight event.  No N/V/D.  Tolerating diet.  no bleeding less confused  Allergies    codeine (Anaphylaxis)    Intolerances  General:  No wt loss, fevers, chills, night sweats, fatigue,   Eyes:  Good vision, no reported pain  ENT:  No sore throat, pain, runny nose, dysphagia  CV:  No pain, palpitations, hypo/hypertension  Resp:  No dyspnea, cough, tachypnea, wheezing  GI:  No pain, No nausea, No vomiting, No diarrhea, No constipation, No weight loss, No fever, No pruritis, No rectal bleeding, No tarry stools, No dysphagia,  :  No pain, bleeding, incontinence, nocturia  Muscle:  No pain, weakness  Neuro:  No weakness, tingling, memory problems  Psych:  No fatigue, insomnia, mood problems, depression  Endocrine:  No polyuria, polydipsia, cold/heat intolerance  Heme:  No petechiae, ecchymosis, easy bruisability  Skin:  No rash, tattoos, scars, edema      PHYSICAL EXAM:   Vital Signs:  Vital Signs Last 24 Hrs  T(C): 37 (03 Dec 2018 08:00), Max: 37 (03 Dec 2018 08:00)  T(F): 98.6 (03 Dec 2018 08:00), Max: 98.6 (03 Dec 2018 08:00)  HR: 109 (03 Dec 2018 10:00) (109 - 119)  BP: 124/59 (03 Dec 2018 10:00) (110/62 - 152/54)  BP(mean): 84 (03 Dec 2018 10:00) (76 - 113)  RR: 19 (03 Dec 2018 10:00) (9 - 135)  SpO2: 94% (03 Dec 2018 10:00) (94% - 100%)  Daily     Daily Weight in k.2 (03 Dec 2018 08:00)I&O's Summary    02 Dec 2018 07:01  -  03 Dec 2018 07:00  --------------------------------------------------------  IN: 1590 mL / OUT: 1940 mL / NET: -350 mL    03 Dec 2018 07:01  -  03 Dec 2018 11:02  --------------------------------------------------------  IN: 390 mL / OUT: 115 mL / NET: 275 mL        GENERAL:  Appears stated age, well-groomed, well-nourished, no distress  HEENT:  NC/AT,  conjunctivae clear and pink, no thyromegaly, nodules, adenopathy, no JVD, sclera -anicteric  CHEST:  Full & symmetric excursion, no increased effort, breath sounds clear  HEART:  Regular rhythm, S1, S2, no murmur/rub/S3/S4, no abdominal bruit, no edema  ABDOMEN:  Soft, non-tender, non-distended, normoactive bowel sounds,  no masses ,no hepato-splenomegaly, no signs of chronic liver disease  EXTEREMITIES:  no cyanosis,clubbing or edema  SKIN:  No rash/erythema/ecchymoses/petechiae/wounds/abscess/warm/dry  NEURO:  Alert, oriented, no asterixis, no tremor, no encephalopathy      LABS:                        7.7    15.08 )-----------( 70       ( 03 Dec 2018 06:24 )             22.4     12    138  |  105  |  48<H>  ----------------------------<  168<H>  4.8   |  24  |  1.10    Ca    7.9<L>      03 Dec 2018 06:24  Phos  3.2       Mg     1.6         TPro  4.2<L>  /  Alb  2.1<L>  /  TBili  3.1<H>  /  DBili  1.10<H>  /  AST  38<H>  /  ALT  22  /  AlkPhos  71  12    PT/INR - ( 03 Dec 2018 06:24 )   PT: 18.5 sec;   INR: 1.61 ratio         PTT - ( 03 Dec 2018 06:24 )  PTT:30.6 sec    amylase   lipaseLipase, Serum: 70 U/L (30 @ 16:25)    RADIOLOGY & ADDITIONAL TESTS: No

## 2021-12-02 NOTE — BH SOCIAL WORK INITIAL PSYCHOSOCIAL EVALUATION - DETAILS
Patient reported mother is diagnosed with Bipolar D/O and substance use disorder, currently not using.

## 2021-12-02 NOTE — BH INPATIENT PSYCHIATRY ASSESSMENT NOTE - CURRENT MEDICATION
MEDICATIONS  (STANDING):  amoxicillin  875 milliGRAM(s)/clavulanate 1 Tablet(s) Oral two times a day  lithium 300 milliGRAM(s) Oral two times a day    MEDICATIONS  (PRN):  haloperidol     Tablet 5 milliGRAM(s) Oral every 8 hours PRN severe agitation  ibuprofen  Tablet. 400 milliGRAM(s) Oral every 8 hours PRN Mild Pain (1 - 3), Moderate Pain (4 - 6)  LORazepam     Tablet 2 milliGRAM(s) Oral every 8 hours PRN severe agitation   MEDICATIONS  (STANDING):  cefpodoxime 400 milliGRAM(s) Oral every 12 hours  influenza   Vaccine 0.5 milliLiter(s) IntraMuscular once  lidocaine 1% Injectable 20 milliLiter(s) Local Injection once  lithium 300 milliGRAM(s) Oral two times a day  metroNIDAZOLE    Tablet 500 milliGRAM(s) Oral every 8 hours    MEDICATIONS  (PRN):  diphenhydrAMINE 50 milliGRAM(s) Oral every 6 hours PRN anxiety, insomnia  haloperidol     Tablet 5 milliGRAM(s) Oral every 8 hours PRN severe agitation  ibuprofen  Tablet. 400 milliGRAM(s) Oral every 8 hours PRN Mild Pain (1 - 3), Moderate Pain (4 - 6)  LORazepam     Tablet 2 milliGRAM(s) Oral every 8 hours PRN severe agitation  oxycodone    5 mG/acetaminophen 325 mG 2 Tablet(s) Oral every 6 hours PRN pain at incision site

## 2021-12-02 NOTE — ED ADULT NURSE REASSESSMENT NOTE - NS ED NURSE REASSESS COMMENT FT1
Pt was accepted to [facility] in the [facility] transfer will occur after 7am. Report called to Sandhya LOZA on unit 8 [facility].

## 2021-12-02 NOTE — ED BEHAVIORAL HEALTH NOTE - BEHAVIORAL HEALTH NOTE
===================  PRE-HOSPITAL COURSE  ===================  SOURCE:  RN ____ and secondhand ED documentation.  DETAILS:  Per chart, patient     ============  ED COURSE  ============  SOURCE:  RN ____ and secondhand ED documentation.  ARRIVAL:  Per chart and RN, patient arrived ______. Per RN, patient was _______ upon arrival, and ______ with triage process.  BELONGINGS:  Per RN, patient arrived with ______. All belongings were provided to hospital security, and patient is currently in a gown with a 1:1 staff member.  BEHAVIOR: RN described patient to be _______, presenting with ________ thought process, (AAOx3?), presenting with ____ mood and ____ affect, remains in ______ behavioral and impulse control, is ____ violent/aggressive. RN stated that the patient is (SI/HI/A/VH?). RN stated that there ______ visible marks, bruises, or lacerations on the body. RN stated that the patient appears to be _____-groomed, maintains ____ hygiene, and reports _____ ADLs, ambulates __________ (without assistance?).  TREATMENT:  Per chart and RN, patient (did/did not) PRN medications.  VISITORS:  Per RN,     ========================  FOR EACH COLLATERAL   ========================  NAME:  NUMBER:  RELATIONSHIP:  RELIABILITY:  COMMENTS:     ========================  PATIENT DEMOGRAPHICS:  ========================  HPI  BASELINE FUNCTIONING:  DATE HPI STARTED:  DECOMPENSATION:  SUICIDALITY  VIOLENCE:    SUBSTANCE:          ========================  PAST PSYCHIATRIC HISTORY  ========================  DATE PAST PSYCHIATRIC HISTORY STARTED:  MAIN PSYCHIATRIC DIAGNOSIS:  PSYCHIATRIC HOSPITALIZATIONS:    PRIOR ILLNESS:  SUICIDALITY:    VIOLENCE:    SUBSTANCE USE:       ==============  OTHER HISTORY  ==============  CURRENT MEDICATION:    MEDICAL HISTORY:    ALLERGIES:     LEGAL ISSUES:  FIREARM ACCESS:  SOCIAL HISTORY:     COVID Exposure Screen- collateral (i.e. third-party, chart review, belongings, etc; include EMS and ED staff)  1.        *Has the patient had a COVID-19 test in the last 90 days?  (  ) Yes   (  ) No   (  ) Unknown- Reason: _____  IF YES PROCEED TO QUESTION #2. IF NO OR UNKNOWN, PLEASE SKIP TO QUESTION #3.  2.        Date of test(s) and result(s): ________  3.        *Has the patient tested positive for COVID-19 antibodies? (  ) Yes   (  ) No   (  ) Unknown- Reason: _____  IF YES PROCEED TO QUESTION #4. IF NO or UNKNOWN, PLEASE SKIP TO QUESTION #5.  4.        Date of positive antibody test: ________  5.        *Has the patient received 2 doses of the COVID-19 vaccine? (  ) Yes   (  ) No   (  ) Unknown- Reason: _____  IF YES PROCEED TO QUESTION #6. IF NO or UNKNOWN, PLEASE SKIP TO QUESTION #7.  6.         Date of second dose: ________  7.        *In the past 10 days, has the patient been around anyone with a positive COVID-19 test?* (  ) Yes   (  ) No   (  ) Unknown- Reason: __  IF YES PROCEED TO QUESTION #8. IF NO or UNKNOWN, PLEASE SKIP TO QUESTION #13.  8.        Was the patient within 6 feet of them for at least 15 minutes? (  ) Yes   (  ) No   (  ) Unknown- Reason: _____  9.        Did the patient provide care for them? (  ) Yes   (  ) No   (  ) Unknown- Reason: ______  10.     Did the patient have direct physical contact with them (touched, hugged, or kissed them)? (  ) Yes   (  ) No	(  ) Unknown- Reason: __  11.     Did the patient share eating or drinking utensils with them? (  ) Yes   (  ) No	(  ) Unknown- Reason: ____  12.     Did they sneeze, cough, or somehow get respiratory droplets on the patient? (  ) Yes   (  ) No	(  ) Unknown- Reason: ______  13.     *Has the patient been out of [place] within the past 10 days?* (  ) Yes   (  ) No   (  ) Unknown- Reason: _____  IF YES PLEASE ANSWER THE FOLLOWING QUESTIONS:  14.     Which state/country did they go to? ______  15.     Were they there over 24 hours? (  ) Yes   (  ) No	(  ) Unknown- Reason: ______  16.     Date of return to [facility]: ______ ===================  PRE-HOSPITAL COURSE  ===================  SOURCE:  RN and secondhand ED documentation.  DETAILS:  Per chart, patient presented to the ED as a walk-in due to having difficulties engaging in outpatient care and not having a supply of her psychiatric meds.     ============  ED COURSE  ============  SOURCE:  RN and secondhand ED documentation.  ARRIVAL:  Per chart and RN, patient arrived as a walk in unaccompanied by supports. Per RN, patient was calm upon arrival and compliant with triage process. RN stated that the pt got slightly upset when belongings were taken away however continued to be cooperative.   BELONGINGS: No belongings of note.  BEHAVIOR: RN described patient to be currently calm and cooperative. presenting with linear thought process and thought content WNL, is AAOx3, presenting with labile mood and appropriate affect, remains in good behavioral and impulse control, is not currently violent/aggressive. RN stated that the patient is denying SI/HI/A/VH. RN stated that there are no visible marks, bruises, or lacerations on the body. RN stated that the patient appears to be well-groomed.  TREATMENT:  Per chart and RN, patient did not require PRN medications.  VISITORS: None     ========================  FOR EACH COLLATERAL   ========================  NAME: Sanya Hector  NUMBER: [phone]  RELATIONSHIP: Father   RELIABILITY: High  COMMENTS:      ========================  PATIENT DEMOGRAPHICS: Patient is a 26F domiciled with her father and 9yo sister, works at [employer], is in a relationship, without children/non-caregiver.  ========================  HPI  BASELINE FUNCTIONING: Father described pt to be calm, linear, in good behavioral control, interacts well with others. Father states pt maintains good self care and good ADLs, works for [employer] full time. Father states that the pt has psych hx however has no formal outpatient psychiatric tx enrollment due to having difficulties enrolling in outpt tx. Pt has reportedly tried tx at [facility] however was not satisfied with tx.   DATE HPI STARTED: 12/1/21  DECOMPENSATION: Father states that in the last month, pt as been more labile and less motivated to attend work. Father states that the pt has had difficulties with attending work because of her increased sleep, states that she will sleep 12-14hrs of sleep a night. Father expressed wanting pt to be admitted to be stabilized again on medication. Father denies pt has recently expressed SI/HI/A/VH.   SUICIDALITY: None  VIOLENCE:  None  SUBSTANCE:  None     ========================  PAST PSYCHIATRIC HISTORY  ========================  DATE PAST PSYCHIATRIC HISTORY STARTED: Unclear  MAIN PSYCHIATRIC DIAGNOSIS: Bipolar disorder  PSYCHIATRIC HOSPITALIZATIONS:  Father reports pt has been admitted to [facility] 8/2021, states that pt was disatissfied with treatment at [facility] 4 unit.   PRIOR ILLNESS: None  SUICIDALITY:  Father states pt has expressed SI without a plan/intent/attempt in the past. Father reports pt has hx/o NSSI cutting several years ago.  VIOLENCE:  Father states pt has become violent and aggressive in the past, denies physical aggression.   SUBSTANCE USE:  Father states pt may have used marijuana in the past.      ==============  OTHER HISTORY  ==============  CURRENT MEDICATION: Father states pt is supposed to be prescribed Lithium  however has not been on it since her last admission that took place at [facility] in August 2021  MEDICAL HISTORY:  None  ALLERGIES: None  LEGAL ISSUES: None  FIREARM ACCESS: None  SOCIAL HISTORY: Father reports pt's mother has a hx/o bipolar disorder and substance abuse, states that the mother was also admitted to [facility] when pt was 6yo. Father states that the pt is unaware of this.      COVID Exposure Screen- collateral (i.e. third-party, chart review, belongings, etc; include EMS and ED staff)  1.        *Has the patient had a COVID-19 test in the last 90 days?  (  ) Yes   ( x ) No   (  ) Unknown- Reason: _____  IF YES PROCEED TO QUESTION #2. IF NO OR UNKNOWN, PLEASE SKIP TO QUESTION #3.  2.        Date of test(s) and result(s): ________  3.        *Has the patient tested positive for COVID-19 antibodies? (  ) Yes   ( x ) No   (  ) Unknown- Reason: _____  IF YES PROCEED TO QUESTION #4. IF NO or UNKNOWN, PLEASE SKIP TO QUESTION #5.  4.        Date of positive antibody test: ________  5.        *Has the patient received 2 doses of the COVID-19 vaccine? (  x) Yes   (  ) No   (  ) Unknown- Reason: _____  IF YES PROCEED TO QUESTION #6. IF NO or UNKNOWN, PLEASE SKIP TO QUESTION #7.  6.         Date of second dose: July 2021  7.        *In the past 10 days, has the patient been around anyone with a positive COVID-19 test?* (  ) Yes   ( x ) No   (  ) Unknown- Reason: __  IF YES PROCEED TO QUESTION #8. IF NO or UNKNOWN, PLEASE SKIP TO QUESTION #13.  8.        Was the patient within 6 feet of them for at least 15 minutes? (  ) Yes   (  ) No   (  ) Unknown- Reason: _____  9.        Did the patient provide care for them? (  ) Yes   (  ) No   (  ) Unknown- Reason: ______  10.     Did the patient have direct physical contact with them (touched, hugged, or kissed them)? (  ) Yes   (  ) No	(  ) Unknown- Reason: __  11.     Did the patient share eating or drinking utensils with them? (  ) Yes   (  ) No	(  ) Unknown- Reason: ____  12.     Did they sneeze, cough, or somehow get respiratory droplets on the patient? (  ) Yes   (  ) No	(  ) Unknown- Reason: ______  13.     *Has the patient been out of [place] within the past 10 days?* (  ) Yes   ( x ) No   (  ) Unknown- Reason: _____  IF YES PLEASE ANSWER THE FOLLOWING QUESTIONS:  14.     Which state/country did they go to? ______  15.     Were they there over 24 hours? (  ) Yes   (  ) No	(  ) Unknown- Reason: ______  16.     Date of return to [facility]: ______

## 2021-12-02 NOTE — BH PATIENT PROFILE - INFORMATION PROVIDED TO:
[Lower Ext Edema] : lower extremity edema [Nocturia] : nocturia patient [Back Pain] : back pain [Negative] : Heme/Lymph [Nausea] : nausea [Dizziness] : dizziness [Fever] : no fever [Chills] : no chills [Hot Flashes] : no hot flashes [Chest Pain] : no chest pain [Night Sweats] : no night sweats [Recent Change In Weight] : ~T no recent weight change [Orthopena] : no orthopnea [Claudication] : no  leg claudication [Palpitations] : no palpitations [Shortness Of Breath] : no shortness of breath [Wheezing] : no wheezing [Abdominal Pain] : no abdominal pain [Dyspnea on Exertion] : not dyspnea on exertion [Constipation] : no constipation [Cough] : no cough [Vomiting] : no vomiting [Diarrhea] : no diarrhea [Dysuria] : no dysuria [Melena] : no melena [Heartburn] : no heartburn [Incontinence] : no incontinence [Hesitancy] : no hesitancy [Hematuria] : no hematuria [Impotence] : no impotency [Frequency] : no frequency [Poor Libido] : libido not poor [Joint Stiffness] : no joint stiffness [Itching] : no itching [Hair Changes] : no hair changes [Nail Changes] : no nail changes [Mole Changes] : no mole changes [Skin Rash] : no skin rash [Headache] : no headache [Memory Loss] : no memory loss [Fainting] : no fainting [Confusion] : no confusion [Insomnia] : no insomnia [Suicidal] : not suicidal [Anxiety] : no anxiety [Depression] : no depression

## 2021-12-02 NOTE — CONSULT NOTE ADULT - ASSESSMENT
27 yo F pmh of IBS, pilonidal cyst, groin and gluteal abscesses, and bipolar disorder currently admitted for suicidal ideation. We are consulted for left gluteal abscess. The abscess was incised and drained at bedside. a strip gauze was left in place and can be removed tomorrow. Recs:   -Switch Abx to Cefpodixime/Flagyl   -Team 4C will assess in am  25 yo F pmh of IBS, pilonidal cyst, groin and gluteal abscesses, and bipolar disorder currently admitted for suicidal ideation. We are consulted for left gluteal abscess. The abscess was incised and drained at bedside. a strip gauze was left in place and can be removed tomorrow. Recs:   -Switch Abx to Cefpodixime/Flagyl for better coverage (PO)  -Team 4C will assess in am for dressing change    eventually once out of hospital, plan to follow up with colorectal (Dr. Boswell or Jihan) for definitive treatment of pilonidal cyst - as she mentions this keeps recurring.

## 2021-12-02 NOTE — CONSULT NOTE ADULT - SUBJECTIVE AND OBJECTIVE BOX
Surgery Consult Note    HPI: 27 yo F PMH of bipolar depression, IBS, pilonidal cyst,  Cannabis use currently admitted for suicidal ideation. We are consulted because of left buttock abscess. She endorsed having extensive history of groin and inguinal abscesses. Last time she had abscess drained was June this year. This time her abscess has been there for the past 2 weeks and it is very painful. She denied any discharge. She endorsed some constipation, and periumbilical abd pain as part of her IBS.     PAST MEDICAL & SURGICAL HISTORY:  Pilonidal cyst    Psoriasis    IBS (irritable bowel syndrome)        MEDICATIONS  (STANDING):  amoxicillin  875 milliGRAM(s)/clavulanate 1 Tablet(s) Oral two times a day  influenza   Vaccine 0.5 milliLiter(s) IntraMuscular once  lidocaine 1% Injectable 20 milliLiter(s) Local Injection once  lithium 300 milliGRAM(s) Oral two times a day    MEDICATIONS  (PRN):  haloperidol     Tablet 5 milliGRAM(s) Oral every 8 hours PRN severe agitation  ibuprofen  Tablet. 400 milliGRAM(s) Oral every 8 hours PRN Mild Pain (1 - 3), Moderate Pain (4 - 6)  LORazepam     Tablet 2 milliGRAM(s) Oral every 8 hours PRN severe agitation      Allergies    No Known Allergies    Intolerances        SOCIAL HISTORY:    FAMILY HISTORY:      Vital Signs Last 24 Hrs  T(C): 36.7 (02 Dec 2021 11:30), Max: 36.7 (02 Dec 2021 11:30)  T(F): 98.1 (02 Dec 2021 11:30), Max: 98.1 (02 Dec 2021 11:30)  HR: 111 (02 Dec 2021 11:30) (111 - 111)  BP: 119/79 (02 Dec 2021 11:30) (119/79 - 119/79)  BP(mean): --  RR: 20 (02 Dec 2021 11:30) (20 - 20)  SpO2: 100% (02 Dec 2021 11:30) (100% - 100%)    I&O's Summary      Physical Exam:  General: appear to be very anxious and volatile, lying on her abdomen because of the painful gluteal abscess  HEENT: NC/AT, EOMI  Pulmonary: normal resp efforrt  Cardiovascular: NSR,   Abdominal: soft, ND/NT  Gluteal region: 1 cm area of fluctuance in the upper medial left gluteal area surrounded by erythema     Lines/drains/tubes:    LABS:                        12.5   11.83 )-----------( 347      ( 02 Dec 2021 16:45 )             38.9     12-02    136  |  103  |  9   ----------------------------<  98  3.8   |  24  |  0.75    Ca    8.8      02 Dec 2021 16:45    TPro  7.3  /  Alb  4.0  /  TBili  0.7  /  DBili  x   /  AST  16  /  ALT  16  /  AlkPhos  76  12-02        CAPILLARY BLOOD GLUCOSE        LIVER FUNCTIONS - ( 02 Dec 2021 16:45 )  Alb: 4.0 g/dL / Pro: 7.3 g/dL / ALK PHOS: 76 U/L / ALT: 16 U/L / AST: 16 U/L / GGT: x             Cultures:      RADIOLOGY & ADDITIONAL STUDIES:

## 2021-12-03 LAB
CHOLEST SERPL-MCNC: 151 MG/DL — SIGNIFICANT CHANGE UP
CULTURE RESULTS: SIGNIFICANT CHANGE UP
HDLC SERPL-MCNC: 49 MG/DL — LOW
LIPID PNL WITH DIRECT LDL SERPL: 92 MG/DL — SIGNIFICANT CHANGE UP
NON HDL CHOLESTEROL: 102 MG/DL — SIGNIFICANT CHANGE UP
SPECIMEN SOURCE: SIGNIFICANT CHANGE UP
TRIGL SERPL-MCNC: 49 MG/DL — SIGNIFICANT CHANGE UP

## 2021-12-03 PROCEDURE — 99233 SBSQ HOSP IP/OBS HIGH 50: CPT

## 2021-12-03 RX ORDER — IBUPROFEN 200 MG
400 TABLET ORAL ONCE
Refills: 0 | Status: COMPLETED | OUTPATIENT
Start: 2021-12-03 | End: 2021-12-03

## 2021-12-03 RX ORDER — OXYCODONE AND ACETAMINOPHEN 5; 325 MG/1; MG/1
2 TABLET ORAL EVERY 6 HOURS
Refills: 0 | Status: DISCONTINUED | OUTPATIENT
Start: 2021-12-03 | End: 2021-12-04

## 2021-12-03 RX ORDER — DIPHENHYDRAMINE HCL 50 MG
50 CAPSULE ORAL EVERY 6 HOURS
Refills: 0 | Status: DISCONTINUED | OUTPATIENT
Start: 2021-12-03 | End: 2021-12-06

## 2021-12-03 RX ORDER — METRONIDAZOLE 500 MG
500 TABLET ORAL EVERY 8 HOURS
Refills: 0 | Status: DISCONTINUED | OUTPATIENT
Start: 2021-12-03 | End: 2021-12-06

## 2021-12-03 RX ORDER — CEFPODOXIME PROXETIL 100 MG
400 TABLET ORAL EVERY 12 HOURS
Refills: 0 | Status: DISCONTINUED | OUTPATIENT
Start: 2021-12-03 | End: 2021-12-06

## 2021-12-03 RX ADMIN — Medication 400 MILLIGRAM(S): at 03:17

## 2021-12-03 RX ADMIN — OXYCODONE AND ACETAMINOPHEN 2 TABLET(S): 5; 325 TABLET ORAL at 18:11

## 2021-12-03 RX ADMIN — Medication 1 TABLET(S): at 11:55

## 2021-12-03 RX ADMIN — Medication 50 MILLIGRAM(S): at 02:04

## 2021-12-03 RX ADMIN — Medication 400 MILLIGRAM(S): at 22:29

## 2021-12-03 RX ADMIN — Medication 500 MILLIGRAM(S): at 23:19

## 2021-12-03 RX ADMIN — LITHIUM CARBONATE 300 MILLIGRAM(S): 300 TABLET, EXTENDED RELEASE ORAL at 22:30

## 2021-12-03 RX ADMIN — LITHIUM CARBONATE 300 MILLIGRAM(S): 300 TABLET, EXTENDED RELEASE ORAL at 11:55

## 2021-12-03 RX ADMIN — Medication 400 MILLIGRAM(S): at 02:04

## 2021-12-03 RX ADMIN — Medication 500 MILLIGRAM(S): at 18:11

## 2021-12-03 NOTE — BH CHART NOTE - NSEVENTNOTEFT_PSY_ALL_CORE
PHYSICAL EXAM: Declined today    VITALS: T(C): 36.5 (12-03-21 @ 09:00), Max: 36.5 (12-03-21 @ 09:00)  HR: 103 (12-03-21 @ 09:00) (103 - 103)  BP: 117/80 (12-03-21 @ 09:00) (117/80 - 117/80)  RR: 18 (12-03-21 @ 09:00) (18 - 18)  SpO2: 100% (12-03-21 @ 09:00) (100% - 100%)

## 2021-12-03 NOTE — PROGRESS NOTE ADULT - ASSESSMENT
25 yo female with recurrent pilonidal abscess, now s/p I&D (12/3). Improving area after I&D.    Recommend continue PO cefpodoxime/flagyl (7 days) to complete course  Dressing was changed today with gauze, would change daily. She may shower of course, and can replace dressing afterward. She no longer requires packing, we left a small corner of gauze in the I&D incision just to keep it from closing.     Counselled patient that persistent drainage from the area is expected, and that it is in fact better for the infection to drain.    After discharge from the behavioral health unit, she should follow up with colorectal office outpatient setting (Dr. Boswell or Dr. Blair) for further management for her pilonidal cysts.     For pain, tylenol/ibuprofen/oxycodone as primary team sees fit.     Patient was very appreciative of care.

## 2021-12-03 NOTE — BH PSYCHOLOGY - CLINICIAN PSYCHOTHERAPY NOTE - NSBHPSYCHOLNARRATIVE_PSY_A_CORE FT
APPEARANCE:  [X] adequately groomed [] disheveled [] malodorous [] Other:  BEHAVIOR: [X] cooperative [] uncooperative [X] good EC [] poor EC [X] well related [] oddly related [] guarded []PMA [] PMR []abnormal movements [] Other:  SPEED: [X] normal rate/rhythm/volume [] loud [] quiet [] slow [] rapid [] pressured [] Other: _________  MOOD: [X] euthymic [] dysphoric [] anxious [] irritable [] Other: ___________  AFFECT: [X] full [] expansive [] constricted [] blunted [] flat [X] stable [] labile [] Other: ________________ THOUGHT PROCESS: [X] organized [] disorganized [X] goal-directed [] concrete [X] logical [] illogical  [] circumstantial [] tangential [] impoverished [] effusive [] repetitive [] Other:  THOUGHT CONTENT: [X] negative for delusions/suicidal ideation /homicidal ideation [] positive for delusions/suicidal ideation/homicidal ideation Describe:  PERCEPTION: [X] negative for auditory/ visual hallucinations [] positive for auditory/ visual hallucinations Describe: ________________________________________________________  INSIGHT/JUDGMENT: [X] good []fair [] poor       IMPULSE CONTROL: [X] good []fair [] poor    COGNITION: [X] alert and oriented to person, time, place [] Lacks orientation to person/time/place. Describe: ___________________________    Risk assessment as applicable (consider static vs modifiable risk factors and protective factors; comment on level of risk for dangerous behavior):     [] suicide [] self-harm [] elopement [] aggression [] Other:  [] ideation           [] intent              [] plan  [] prior incidences (if checked, elaborate)  [] family history of suicide            [] family history of aggression     Static:   Modifiable: Patient shared that her father was a trigger for her emotional distress, and she anticipates that she will likely have to return to living with him in his home after discharge; however, she and her girlfriend have plans to find another place to live together, which she anticipates will be a lot better for her mental health.  Protective: Patient stated that she has a girlfriend who is supportive. She has no intent to die or end her life because she is afraid of the uncertainty of what comes after death.

## 2021-12-03 NOTE — CHART NOTE - NSCHARTNOTEFT_GEN_A_CORE
[facility]  PHYSICAL EXAM: Agree/Declined    VITALS: T(C): 36.7 (12-02-21 @ 11:30), Max: 36.7 (12-02-21 @ 11:30)  HR: 111 (12-02-21 @ 11:30) (111 - 111)  BP: 119/79 (12-02-21 @ 11:30) (119/79 - 119/79)  RR: 20 (12-02-21 @ 11:30) (20 - 20)  SpO2: 100% (12-02-21 @ 11:30) (100% - 100%)      GENERAL: NAD, comfortable, ambulating  HEAD:  Atraumatic, Normocephalic  EYES: EOMI, PERRLA, conjunctiva and sclera clear  ENT: Moist mucous membranes  NECK: Supple, No JVD  CHEST/LUNG: Clear to auscultation bilaterally; No rales, rhonchi, wheezing, or rubs. Unlabored respirations  HEART: Regular rate and rhythm; No murmurs, rubs, or gallops  ABDOMEN: BSx4; Soft, nontender, nondistended  EXTREMITIES:  No clubbing, cyanosis, or edema  NERVOUS SYSTEM:  A&Ox3, no focal deficits   SKIN: No rashes or lesions

## 2021-12-03 NOTE — BH PSYCHOLOGY - CLINICIAN PSYCHOTHERAPY NOTE - NSBHPSYCHOLPARTICIPCOMMENT_PSY_A_CORE FT
Writer and patient met for their first individual psychotherapy session together. Patient shared that her father was a trigger for her emotional distress. She also spoke about her stepmother who has been around since patient was 4 years old, and described that her step mother and father had a difficult relationship and she is often caught in the middle of it. She futher described often feeling gaslit by her father when he tells her how she is feeling or how she should be feeling. Patient has internalized this to some extent because she tends to do this to herself sometimes too. For example, there is often a part of her that doubts if what she is feeling is real or valid. Patient also reported that a part of her does not wish to get better because her depression feels like warmth. She has struggled with depression for so long that it has become almost part of her identity. She stated that she is used to feeling this way and does not know how to be otherwise; however, patient stated that this is the first hospitalization that she is taking seriously. Patient stated that she was determined to stay in the unit for as long as possible in order to get better. She stated that just yesterday she had been feeling irritable and would have behaved very differently in group therapy compared to how she behaved today, which writer experienced as authentic, respectful, kind, and relatable. Patient stated that if she had attended group yesterday, she likely would have cussed everybody out. She stated that her current girlfriend is a motivating factor for her.

## 2021-12-04 PROCEDURE — 99231 SBSQ HOSP IP/OBS SF/LOW 25: CPT

## 2021-12-04 RX ORDER — OXYCODONE AND ACETAMINOPHEN 5; 325 MG/1; MG/1
1 TABLET ORAL EVERY 6 HOURS
Refills: 0 | Status: DISCONTINUED | OUTPATIENT
Start: 2021-12-04 | End: 2021-12-05

## 2021-12-04 RX ORDER — IBUPROFEN 200 MG
600 TABLET ORAL EVERY 6 HOURS
Refills: 0 | Status: DISCONTINUED | OUTPATIENT
Start: 2021-12-04 | End: 2021-12-06

## 2021-12-04 RX ADMIN — OXYCODONE AND ACETAMINOPHEN 1 TABLET(S): 5; 325 TABLET ORAL at 19:05

## 2021-12-04 RX ADMIN — Medication 500 MILLIGRAM(S): at 17:09

## 2021-12-04 RX ADMIN — OXYCODONE AND ACETAMINOPHEN 1 TABLET(S): 5; 325 TABLET ORAL at 12:28

## 2021-12-04 RX ADMIN — LITHIUM CARBONATE 300 MILLIGRAM(S): 300 TABLET, EXTENDED RELEASE ORAL at 16:59

## 2021-12-04 RX ADMIN — Medication 500 MILLIGRAM(S): at 07:42

## 2021-12-04 NOTE — PROGRESS NOTE ADULT - SUBJECTIVE AND OBJECTIVE BOX
SUBJECTIVE: Patient seen and examined bedside by chief resident. States that there is some drainage from the site.     cefpodoxime 400 milliGRAM(s) Oral every 12 hours  metroNIDAZOLE    Tablet 500 milliGRAM(s) Oral every 8 hours      Vital Signs Last 24 Hrs  T(C): 36.8 (04 Dec 2021 17:39), Max: 37.1 (04 Dec 2021 09:00)  T(F): 98.3 (04 Dec 2021 17:39), Max: 98.7 (04 Dec 2021 09:00)  HR: 98 (04 Dec 2021 17:39) (87 - 98)  BP: 111/72 (04 Dec 2021 17:39) (100/62 - 111/72)  BP(mean): --  RR: 18 (04 Dec 2021 17:39) (16 - 18)  SpO2: 98% (04 Dec 2021 17:39) (98% - 98%)  I&O's Detail      Physical Exam:  Gen- NAD, anxious in bed and screaming when dressing was changed and kept shaking leg  Buttock- I&D site with small corner of gauze in place, removed, expressed minimal seropurulent fluid. Erythema much decreased from day prior      LABS:                RADIOLOGY & ADDITIONAL STUDIES:    25 yo female with recurrent pilonidal abscess, now s/p I&D (12/3). Improving area after I&D.    Recommend continue PO cefpodoxime/flagyl (7 days) to complete course  Dressing was changed today with gauze, would change daily. She may shower of course, and can replace dressing afterward. She no longer requires packing, we left a small corner of gauze in the I&D incision just to keep it from closing.     Counselled patient that persistent drainage from the area is expected, and that it is in fact better for the infection to drain.    After discharge from the behavioral health unit, she should follow up with colorectal office outpatient setting (Dr. Boswell or Dr. Blair) for further management for her pilonidal cysts.     For pain, tylenol/ibuprofen/oxycodone as primary team sees fit.       
DAILY PROGRESS NOTE:  Patient seen and examined. She states she feels a lot better compared to yesterday, and can move around with decreased pain. She states she noticed there is still some blood/purulent drainage from the site.     MEDICATIONS  (STANDING):  amoxicillin  875 milliGRAM(s)/clavulanate 1 Tablet(s) Oral two times a day  influenza   Vaccine 0.5 milliLiter(s) IntraMuscular once  lidocaine 1% Injectable 20 milliLiter(s) Local Injection once  lithium 300 milliGRAM(s) Oral two times a day    MEDICATIONS  (PRN):  diphenhydrAMINE 50 milliGRAM(s) Oral every 6 hours PRN anxiety, insomnia  haloperidol     Tablet 5 milliGRAM(s) Oral every 8 hours PRN severe agitation  ibuprofen  Tablet. 400 milliGRAM(s) Oral every 8 hours PRN Mild Pain (1 - 3), Moderate Pain (4 - 6)  LORazepam     Tablet 2 milliGRAM(s) Oral every 8 hours PRN severe agitation      Vital Signs Last 24 Hrs  T(C): 36.7 (02 Dec 2021 11:30), Max: 36.7 (02 Dec 2021 11:30)  T(F): 98.1 (02 Dec 2021 11:30), Max: 98.1 (02 Dec 2021 11:30)  HR: 111 (02 Dec 2021 11:30) (111 - 111)  BP: 119/79 (02 Dec 2021 11:30) (119/79 - 119/79)  BP(mean): --  RR: 20 (02 Dec 2021 11:30) (20 - 20)  SpO2: 100% (02 Dec 2021 11:30) (100% - 100%)    I&O's Detail      Physical Exam:    Gen- NAD  Buttock- I&D site with packing wick in place, removed, expressed small amount of seropurulent fluid (1 cc). Area on the right site is much less erythematous and no longer tender. There is slight induration persistent around the I&D site (as expected)  Psych- less anxious for exam than yesterday. appears in better spirits about her abscess    LABS:                        12.5   11.83 )-----------( 347      ( 02 Dec 2021 16:45 )             38.9     12-02    136  |  103  |  9   ----------------------------<  98  3.8   |  24  |  0.75    Ca    8.8      02 Dec 2021 16:45    TPro  7.3  /  Alb  4.0  /  TBili  0.7  /  DBili  x   /  AST  16  /  ALT  16  /  AlkPhos  76  12-02

## 2021-12-05 RX ADMIN — Medication 2 MILLIGRAM(S): at 19:16

## 2021-12-05 RX ADMIN — OXYCODONE AND ACETAMINOPHEN 1 TABLET(S): 5; 325 TABLET ORAL at 05:08

## 2021-12-05 RX ADMIN — LITHIUM CARBONATE 300 MILLIGRAM(S): 300 TABLET, EXTENDED RELEASE ORAL at 21:37

## 2021-12-05 RX ADMIN — LITHIUM CARBONATE 300 MILLIGRAM(S): 300 TABLET, EXTENDED RELEASE ORAL at 09:53

## 2021-12-05 RX ADMIN — Medication 50 MILLIGRAM(S): at 21:38

## 2021-12-05 RX ADMIN — Medication 500 MILLIGRAM(S): at 23:03

## 2021-12-05 RX ADMIN — Medication 400 MILLIGRAM(S): at 21:38

## 2021-12-05 RX ADMIN — Medication 500 MILLIGRAM(S): at 17:39

## 2021-12-05 RX ADMIN — Medication 400 MILLIGRAM(S): at 09:55

## 2021-12-05 RX ADMIN — Medication 500 MILLIGRAM(S): at 09:53

## 2021-12-06 VITALS
HEART RATE: 90 BPM | SYSTOLIC BLOOD PRESSURE: 116 MMHG | OXYGEN SATURATION: 99 % | DIASTOLIC BLOOD PRESSURE: 81 MMHG | RESPIRATION RATE: 16 BRPM | TEMPERATURE: 99 F

## 2021-12-06 DIAGNOSIS — F60.9 PERSONALITY DISORDER, UNSPECIFIED: ICD-10-CM

## 2021-12-06 LAB
-  CEFAZOLIN: SIGNIFICANT CHANGE UP
-  CLINDAMYCIN: SIGNIFICANT CHANGE UP
-  ERYTHROMYCIN: SIGNIFICANT CHANGE UP
-  LINEZOLID: SIGNIFICANT CHANGE UP
-  OXACILLIN: SIGNIFICANT CHANGE UP
-  RIFAMPIN: SIGNIFICANT CHANGE UP
-  TRIMETHOPRIM/SULFAMETHOXAZOLE: SIGNIFICANT CHANGE UP
-  VANCOMYCIN: SIGNIFICANT CHANGE UP
CULTURE RESULTS: SIGNIFICANT CHANGE UP
LITHIUM SERPL-MCNC: 0.2 MMOL/L — LOW (ref 0.6–1.2)
METHOD TYPE: SIGNIFICANT CHANGE UP
ORGANISM # SPEC MICROSCOPIC CNT: SIGNIFICANT CHANGE UP
ORGANISM # SPEC MICROSCOPIC CNT: SIGNIFICANT CHANGE UP
SPECIMEN SOURCE: SIGNIFICANT CHANGE UP

## 2021-12-06 PROCEDURE — 87186 SC STD MICRODIL/AGAR DIL: CPT

## 2021-12-06 PROCEDURE — 99238 HOSP IP/OBS DSCHRG MGMT 30/<: CPT

## 2021-12-06 PROCEDURE — 87075 CULTR BACTERIA EXCEPT BLOOD: CPT

## 2021-12-06 PROCEDURE — 80053 COMPREHEN METABOLIC PANEL: CPT

## 2021-12-06 PROCEDURE — 36415 COLL VENOUS BLD VENIPUNCTURE: CPT

## 2021-12-06 PROCEDURE — 85027 COMPLETE CBC AUTOMATED: CPT

## 2021-12-06 PROCEDURE — 87070 CULTURE OTHR SPECIMN AEROBIC: CPT

## 2021-12-06 PROCEDURE — 80178 ASSAY OF LITHIUM: CPT

## 2021-12-06 PROCEDURE — 80061 LIPID PANEL: CPT

## 2021-12-06 RX ORDER — LITHIUM CARBONATE 300 MG/1
1 TABLET, EXTENDED RELEASE ORAL
Qty: 0 | Refills: 0 | DISCHARGE
Start: 2021-12-06

## 2021-12-06 RX ORDER — METRONIDAZOLE 500 MG
1 TABLET ORAL
Qty: 0 | Refills: 0 | DISCHARGE
Start: 2021-12-06

## 2021-12-06 RX ORDER — CEFPODOXIME PROXETIL 100 MG
2 TABLET ORAL
Qty: 20 | Refills: 0
Start: 2021-12-06 | End: 2021-12-10

## 2021-12-06 RX ORDER — LITHIUM CARBONATE 300 MG/1
1 TABLET, EXTENDED RELEASE ORAL
Qty: 28 | Refills: 0
Start: 2021-12-06 | End: 2021-12-19

## 2021-12-06 RX ORDER — METRONIDAZOLE 500 MG
1 TABLET ORAL
Qty: 15 | Refills: 0
Start: 2021-12-06 | End: 2021-12-10

## 2021-12-06 RX ORDER — CEFPODOXIME PROXETIL 100 MG
2 TABLET ORAL
Qty: 0 | Refills: 0 | DISCHARGE
Start: 2021-12-06

## 2021-12-06 RX ADMIN — LITHIUM CARBONATE 300 MILLIGRAM(S): 300 TABLET, EXTENDED RELEASE ORAL at 10:41

## 2021-12-06 RX ADMIN — Medication 500 MILLIGRAM(S): at 10:34

## 2021-12-06 RX ADMIN — Medication 400 MILLIGRAM(S): at 10:34

## 2021-12-06 NOTE — BH INPATIENT PSYCHIATRY DISCHARGE NOTE - NSICDXPASTMEDICALHX_GEN_ALL_CORE_FT
PAST MEDICAL HISTORY:  Anxiety     Bipolar disorder     Depression     IBS (irritable bowel syndrome)     Pilonidal cyst     Psoriasis

## 2021-12-06 NOTE — BH DISCHARGE NOTE NURSING/SOCIAL WORK/PSYCH REHAB - NSDCPRGOAL_PSY_ALL_CORE
Pt. has participated in select groups during admission.  Pt. was initially pleasant at the beginning of her admission, but has been highly irritable and has gotten agitated on several occasions in the last several days.  Pt. demonstrates low frustration tolerance and has been demanding, intrusive, and verbally aggressive in groups and interactions with others on the unit.  Pt. has been devaluing of staff and treatment in the hospital.  Pt. has not responded well to redirection in groups and has not been able to participate appropriately on some occasions.  Pt. has been social with select peers. ambulatory
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
## 2021-12-06 NOTE — BH INPATIENT PSYCHIATRY PROGRESS NOTE - NSBHMSEKNOWHOW_PSY_ALL_CORE
Current Events/Educational attainment/Vocabulary

## 2021-12-06 NOTE — BH INPATIENT PSYCHIATRY PROGRESS NOTE - NSBHATTESTSEENBY_PSY_A_CORE
NP without Attending Psychiatrist
attending Psychiatrist without NP/Trainee
NP without Attending Psychiatrist
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
## 2021-12-06 NOTE — BH DISCHARGE NOTE NURSING/SOCIAL WORK/PSYCH REHAB - PATIENT PORTAL LINK FT
You can access the FollowMyHealth Patient Portal offered by [facility] by registering at the following website: [email or web address]. By joining [facility]’s FollowMyHealth portal, you will also be able to view your health information using other applications (apps) compatible with our system.

## 2021-12-06 NOTE — BH INPATIENT PSYCHIATRY DISCHARGE NOTE - NSDCMRMEDTOKEN_GEN_ALL_CORE_FT
cefpodoxime 200 mg oral tablet: 2 tab(s) orally every 12 hours  lithium 300 mg oral capsule: 1 cap(s) orally 2 times a day  metroNIDAZOLE 500 mg oral tablet: 1 tab(s) orally every 8 hours

## 2021-12-06 NOTE — BH INPATIENT PSYCHIATRY PROGRESS NOTE - NSCGISEVERILLNESS_PSY_ALL_CORE
5 = Markedly ill - intrusive symptoms that distinctly impair social/occupational function or cause intrusive levels of distress

## 2021-12-06 NOTE — BH INPATIENT PSYCHIATRY PROGRESS NOTE - NSBHFUPINTERVALCCFT_PSY_A_CORE
"I don't want to talk"
I'm really not happy with the treatment I've received during this hospitalization 
"I would like to be discharged"
I'm ok

## 2021-12-06 NOTE — BH INPATIENT PSYCHIATRY PROGRESS NOTE - NSTXDEPRESGOAL_PSY_ALL_CORE
Attend and participate in at least 2 groups daily despite low mood/energy
Other...

## 2021-12-06 NOTE — BH INPATIENT PSYCHIATRY PROGRESS NOTE - NSCGIIMPROVESX_PSY_ALL_CORE
4 = No change - symptoms remain essentially unchanged
3 = Minimally improved - slightly better with little or no clinically meaningful reduction of symptoms.  Represents very little change in basic clinical status, level of care, or functional capacity.
3 = Minimally improved - slightly better with little or no clinically meaningful reduction of symptoms.  Represents very little change in basic clinical status, level of care, or functional capacity.

## 2021-12-06 NOTE — BH INPATIENT PSYCHIATRY PROGRESS NOTE - NSICDXBHSECONDARYDX_PSY_ALL_CORE
Moderate cannabis use disorder   F12.20  
Moderate cannabis use disorder   F12.20  Personality disorder   F60.9

## 2021-12-06 NOTE — BH INPATIENT PSYCHIATRY DISCHARGE NOTE - NSDCRECOMMENDMEDICALFT_PSY_ALL_CORE
Per surgery the plan is for follow up with colorectal (Dr. Boswell or Jihan) for definitive treatment of pilonidal cyst

## 2021-12-06 NOTE — BH INPATIENT PSYCHIATRY PROGRESS NOTE - NSTXSUICIDGOAL_PSY_ALL_CORE
Will identify and utilize 2 coping skills

## 2021-12-06 NOTE — BH INPATIENT PSYCHIATRY PROGRESS NOTE - NSBHINPTBILLING_PSY_ALL_CORE
15727 - Inpatient Moderate Complexity
28562 - Inpatient High Complexity
59246 - Inpatient High Complexity

## 2021-12-06 NOTE — BH SAFETY PLAN - WARNING SIGN 1
Pt completed a written safety plan and received a copy to take with her. An additional copy has been filed in the Pt's chart on the unit.

## 2021-12-06 NOTE — BH INPATIENT PSYCHIATRY PROGRESS NOTE - NSICDXBHPRIMARYDX_PSY_ALL_CORE
Bipolar 1 disorder, depressed, severe   F31.4  

## 2021-12-06 NOTE — BH INPATIENT PSYCHIATRY PROGRESS NOTE - NSBHFUPINTERVALHXFT_PSY_A_CORE
Patient visible on the unit this morning, seen in her room. Excited to see writer. She reports dissatisfaction with her hospitalization thus fair, not feeling that she is getting the level of support and attention that she needs from staff. Was very upset that she was crying in her room and per pt staff walked by and didn't acknowledge her distress. She also reported not liking the groups, not feeling that much effort was being put into it by the staff and overall not feeling that the unit is therapeutic. She states that she is happy to be back on medication, happy that her cyst was lanced, but wants to be discharged so that she can go back to work as her boss recently texted her girlfriend that pt needed to come back to work. Pt states that she would like to go to the same clinic that her girlfriend goes to where she can set up an appt for herself. She denies si/hi/avh or paranoid ideation. Submitted a 72hr letter on 12/4/21. Sleep and appetite are reported to be fair. No acute medical concerns, has had dressing changed regularly since cyst was lanced. Pt aware that lithium level is subtherapeutic and further titration is recommended for improvement of her sxs, mood changes and irritability.   Per staff report pt has been irritable over the weekend, devaluing of staff and care, but is future oriented, denying suicidality etc. 
Pt seen, case discussed with nursing. Patient reportedly placed a 72H letter 12/4 9.49PM and that he has expressed his discontent regarding her inpatient stay. Patient seen today in the activity room with her girlfriend present during visitation hour, prefers to be seen with girlfriend present. She expresses her dissatisfaction regarding not receiving a personal therapist, not having enough activity in the unit, and that the milieu is not therapeutic for her. She says she came to the hospital without SI/HI and her primary need is to be restarted on lithium, which she currently takes 300mg twice daily with no side effects. Patient says she is happy that her cyst was removed during her stay and that she was started on medication but she requests to be discharged tomorrow/ Tuesday as she wants to return to work at [employer]. She says she plans to have an outpatient follow up at her girlfriend's clinic at "[facility]" in [place] where the waiting time is around 3-5 days "I'm not waiting that long to get discharged". Despite expressing her frustrations, patient's presentation is appropriate, not menacing, and her feelings are validated. Girlfriend did not indicate acute concerns for discharge. She is amenable to discuss her disposition planning with primary team tomorrow but made vague conditional threats such as involving police or having her parents come to the hospital if she is not discharged by early next week. She understands that lithium requires blood monitoring and of its narrow therapeutic index; she agrees for bloodwork tomorrow.
Pt seen, case discussed with nursing. Irritable today reportedly due to the discomfort s/p pilonidal cyst removal, focused on pain medications. Denies any other concerns (only nausea to abx), and not willing to engage with the writer. Denies SI/Hi/AVH.  Refused labs this am.    Per MAR, took only one tab of oxycodone instead of two today, no other PRNs
Patient seen in her room, reports that her pain has overall improved since yesterday and is happy that her pilonidal cyst was able to be lanced here on the unit. Sleep last night was fair and appetite was been unchanged. She has since been attending groups and finds that they are beneficial, she was able to share her experiences today which she hadn't been able to do in a long time. Denies si/hi/avh or paranoid ideation. Is happy to be back on Lithium, would like mood to be better stabilized prior to initiation of antidepressant.

## 2021-12-06 NOTE — BH INPATIENT PSYCHIATRY DISCHARGE NOTE - NSBHMETABOLIC_PSY_ALL_CORE_FT
BMI: BMI (kg/m2): 34.6 (12-03-21 @ 15:02)  HbA1c:   Glucose:   BP: 116/81 (12-06-21 @ 09:00) (100/62 - 119/67)  Lipid Panel: Date/Time: 12-03-21 @ 07:55  Cholesterol, Serum: 151  Direct LDL: --  HDL Cholesterol, Serum: 49  Total Cholesterol/HDL Ration Measurement: --  Triglycerides, Serum: 49

## 2021-12-06 NOTE — BH INPATIENT PSYCHIATRY DISCHARGE NOTE - HOSPITAL COURSE
Patient was admitted to unit voluntary status. General surgery was consulted shortly after admission due to pilonidal cyst that per pt was causing a tremendous amount of pain. Cyst was lanced and drained by surgery team. The team continued to follow throughout her admission, recs and treatment were appreciated. She had no other medical issues during hospitalization. After her cyst was lanced she reported improvement in mood and level of pain and happy that her medical needs were addressed. She immediately began to attend groups and reported finding them to be benefical, able to share her experiences with others and listen to other people. She denied si/hi/avh or paranoid ideation and was happy to be restarted on Lithium 300mg bid. Shortly after she did submit a 72 hour letter on 12/4 requesting discharge and reported dissatisfaction with her hospitalization citing that she felt that her needs were not being met and that she wasn't getting the amount of attention and support that she needed. She no longer felt that the milieu was therapeutic. She requested to be referred to the same clinic that her girlfriend was going to. She declined to retract her letter, was future oriented, wanting to return to work and begin outpatient treatment. Staff found pt to be irritable and devaluing of staff and treatment during her admission. A lithium level of 0.2 was attained on 12/6/21 and pt was aware that it was subtherapeutic and continued treatment and titration was necessary for optimal symptom stabilization. Safety plan was reviewed and she was able to identify and verbalize sources of support in event of crisis. Pt discharged on her 72 hour letter as she did not meet criteria for retention as she was not at elevated risk of harm to self or others, not psychotic, future oriented, intact thought process.

## 2021-12-06 NOTE — BH INPATIENT PSYCHIATRY PROGRESS NOTE - NSBHMETABOLIC_PSY_ALL_CORE_FT
BMI: BMI (kg/m2): 34.6 (12-03-21 @ 15:02)  HbA1c:   Glucose:   BP: 111/72 (12-04-21 @ 17:39) (100/62 - 119/79)  Lipid Panel: Date/Time: 12-03-21 @ 07:55  Cholesterol, Serum: 151  Direct LDL: --  HDL Cholesterol, Serum: 49  Total Cholesterol/HDL Ration Measurement: --  Triglycerides, Serum: 49  
BMI: BMI (kg/m2): 34.6 (12-03-21 @ 15:02)  HbA1c:   Glucose:   BP: 117/80 (12-03-21 @ 09:00) (117/80 - 119/79)  Lipid Panel: Date/Time: 12-03-21 @ 07:55  Cholesterol, Serum: 151  Direct LDL: --  HDL Cholesterol, Serum: 49  Total Cholesterol/HDL Ration Measurement: --  Triglycerides, Serum: 49  
BMI: BMI (kg/m2): 34.6 (12-03-21 @ 15:02)  HbA1c:   Glucose:   BP: 119/67 (12-05-21 @ 16:46) (100/62 - 119/67)  Lipid Panel: Date/Time: 12-03-21 @ 07:55  Cholesterol, Serum: 151  Direct LDL: --  HDL Cholesterol, Serum: 49  Total Cholesterol/HDL Ration Measurement: --  Triglycerides, Serum: 49  
BMI: BMI (kg/m2): 34.6 (12-03-21 @ 15:02)  HbA1c:   Glucose:   BP: 116/81 (12-06-21 @ 09:00) (100/62 - 119/67)  Lipid Panel: Date/Time: 12-03-21 @ 07:55  Cholesterol, Serum: 151  Direct LDL: --  HDL Cholesterol, Serum: 49  Total Cholesterol/HDL Ration Measurement: --  Triglycerides, Serum: 49
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
## 2021-12-06 NOTE — BH INPATIENT PSYCHIATRY PROGRESS NOTE - NSTXDEPRESDATETRGT_PSY_ALL_CORE
10-Dec-2021
10-Dec-2021
Normal rate, regular rhythm, normal S1, S2 heart sounds heard.
10-Dec-2021
10-Dec-2021

## 2021-12-06 NOTE — BH DISCHARGE NOTE NURSING/SOCIAL WORK/PSYCH REHAB - NSCDUDCCRISIS_PSY_A_CORE
[facility] Well  1 (977) [facility]-WELL ([phone])  Text "WELL" to 27027  Website: www.[employer]/.[employer] [facility]iz[facility] 1 (196) 771-NUNA (7214) Website: www.safehorizon.org/.National Suicide Prevention Lifeline 2 (383) 142-0836/.  [facility]  1 (185) LIFENET (338-7281)/.  [facility]’s [facility]al [facility] Crisis [facility]  [address] [place] [address] [phone]   Hours:  Monday through Friday from 9 AM to 3 PM/.  [facility] - [facility] Crisis Line  8 (010) 116-3615, Option 1 [facility] Well  1 (582) [facility]-WELL ([phone])  Text "WELL" to 93926  Website: www.[employer]/.Safe Horizons 1 (353) 821-EZCX (8024) Website: www.safehorizon.org/.National Suicide Prevention Lifeline 3 (673) 959-3605/.  [facility]  1 (489) LIFENET (567-1663)/.  [facility]’s Behavioral [facility] Crisis [facility]  [address] [place] [address] [phone]   Hours:  Monday through Friday from 9 AM to 3 PM

## 2021-12-06 NOTE — BH INPATIENT PSYCHIATRY PROGRESS NOTE - NSDCCRITERIA_PSY_ALL_CORE
Patient will report improvement in mood and symptoms

## 2021-12-06 NOTE — BH INPATIENT PSYCHIATRY PROGRESS NOTE - CURRENT MEDICATION
MEDICATIONS  (STANDING):  cefpodoxime 400 milliGRAM(s) Oral every 12 hours  influenza   Vaccine 0.5 milliLiter(s) IntraMuscular once  lidocaine 1% Injectable 20 milliLiter(s) Local Injection once  lithium 300 milliGRAM(s) Oral two times a day  metroNIDAZOLE    Tablet 500 milliGRAM(s) Oral every 8 hours    MEDICATIONS  (PRN):  diphenhydrAMINE 50 milliGRAM(s) Oral every 6 hours PRN anxiety, insomnia  haloperidol     Tablet 5 milliGRAM(s) Oral every 8 hours PRN severe agitation  ibuprofen  Tablet. 600 milliGRAM(s) Oral every 6 hours PRN Mild Pain (1 - 3)  LORazepam     Tablet 2 milliGRAM(s) Oral every 8 hours PRN severe agitation  oxycodone    5 mG/acetaminophen 325 mG 1 Tablet(s) Oral every 6 hours PRN Moderate Pain (4 - 6)  
MEDICATIONS  (STANDING):  cefpodoxime 400 milliGRAM(s) Oral every 12 hours  influenza   Vaccine 0.5 milliLiter(s) IntraMuscular once  lidocaine 1% Injectable 20 milliLiter(s) Local Injection once  lithium 300 milliGRAM(s) Oral two times a day  metroNIDAZOLE    Tablet 500 milliGRAM(s) Oral every 8 hours    MEDICATIONS  (PRN):  diphenhydrAMINE 50 milliGRAM(s) Oral every 6 hours PRN anxiety, insomnia  haloperidol     Tablet 5 milliGRAM(s) Oral every 8 hours PRN severe agitation  ibuprofen  Tablet. 400 milliGRAM(s) Oral every 8 hours PRN Mild Pain (1 - 3), Moderate Pain (4 - 6)  LORazepam     Tablet 2 milliGRAM(s) Oral every 8 hours PRN severe agitation

## 2021-12-06 NOTE — BH INPATIENT PSYCHIATRY DISCHARGE NOTE - NSDCCPCAREPLAN_GEN_ALL_CORE_FT
36.7
PRINCIPAL DISCHARGE DIAGNOSIS  Diagnosis: Bipolar affective disorder, mixed  Assessment and Plan of Treatment: Continue current medication regimen and follow up with aftercare appointments      SECONDARY DISCHARGE DIAGNOSES  Diagnosis: Personality disorder  Assessment and Plan of Treatment: Continue current medication regimen and follow up with aftercare appointments    Diagnosis: Cannabis abuse, continuous use  Assessment and Plan of Treatment: Continue current medication regimen and follow up with aftercare appointments

## 2021-12-06 NOTE — BH INPATIENT PSYCHIATRY PROGRESS NOTE - PRN MEDS
MEDICATIONS  (PRN):  diphenhydrAMINE 50 milliGRAM(s) Oral every 6 hours PRN anxiety, insomnia  haloperidol     Tablet 5 milliGRAM(s) Oral every 8 hours PRN severe agitation  ibuprofen  Tablet. 600 milliGRAM(s) Oral every 6 hours PRN Mild Pain (1 - 3)  LORazepam     Tablet 2 milliGRAM(s) Oral every 8 hours PRN severe agitation  oxycodone    5 mG/acetaminophen 325 mG 1 Tablet(s) Oral every 6 hours PRN Moderate Pain (4 - 6)  
MEDICATIONS  (PRN):  diphenhydrAMINE 50 milliGRAM(s) Oral every 6 hours PRN anxiety, insomnia  haloperidol     Tablet 5 milliGRAM(s) Oral every 8 hours PRN severe agitation  ibuprofen  Tablet. 600 milliGRAM(s) Oral every 6 hours PRN Mild Pain (1 - 3)  LORazepam     Tablet 2 milliGRAM(s) Oral every 8 hours PRN severe agitation  oxycodone    5 mG/acetaminophen 325 mG 1 Tablet(s) Oral every 6 hours PRN Moderate Pain (4 - 6)  
MEDICATIONS  (PRN):  diphenhydrAMINE 50 milliGRAM(s) Oral every 6 hours PRN anxiety, insomnia  haloperidol     Tablet 5 milliGRAM(s) Oral every 8 hours PRN severe agitation  ibuprofen  Tablet. 400 milliGRAM(s) Oral every 8 hours PRN Mild Pain (1 - 3), Moderate Pain (4 - 6)  LORazepam     Tablet 2 milliGRAM(s) Oral every 8 hours PRN severe agitation  
MEDICATIONS  (PRN):  diphenhydrAMINE 50 milliGRAM(s) Oral every 6 hours PRN anxiety, insomnia  haloperidol     Tablet 5 milliGRAM(s) Oral every 8 hours PRN severe agitation  ibuprofen  Tablet. 600 milliGRAM(s) Oral every 6 hours PRN Mild Pain (1 - 3)  LORazepam     Tablet 2 milliGRAM(s) Oral every 8 hours PRN severe agitation  oxycodone    5 mG/acetaminophen 325 mG 1 Tablet(s) Oral every 6 hours PRN Moderate Pain (4 - 6)

## 2021-12-07 NOTE — CHART NOTE - NSCHARTNOTEFT_GEN_A_CORE
Called patient to follow up regarding colorectal appointment for follow up on pilonidal cyst.   She did not answer.  Son did not answer.  Will try again. Called patient to follow up regarding colorectal appointment for follow up on pilonidal cyst.   She did not answer.  Son did not answer.  Will try again.    Update:  patient answered call, and states that she is doing well and uses gauze and saline to change her dressing. her pain is tolerable.     called office to make her an appointment Called patient to follow up regarding colorectal appointment for follow up on pilonidal cyst.   She did not answer.  Son did not answer.  Will try again.    Update:  patient answered call, and states that she is doing well and uses gauze and saline to change her dressing. her pain is tolerable. she is continuing her antibiotic regimen until completed (this was given to her given cellulitis around the area which was much improved prior to discharge)    Called office to make her an appointment    Appointment for Tuesday Dec 14, 2021 12:30pm with Dr. Boswell    [address] [place] [address]   [address] [place] [address]    She is amenable to coming for follow up, the address and time was given to her.

## 2021-12-09 DIAGNOSIS — R45.851 SUICIDAL IDEATIONS: ICD-10-CM

## 2021-12-09 DIAGNOSIS — L40.9 PSORIASIS, UNSPECIFIED: ICD-10-CM

## 2021-12-09 DIAGNOSIS — F12.20 CANNABIS DEPENDENCE, UNCOMPLICATED: ICD-10-CM

## 2021-12-09 DIAGNOSIS — F29 UNSPECIFIED PSYCHOSIS NOT DUE TO A SUBSTANCE OR KNOWN PHYSIOLOGICAL CONDITION: ICD-10-CM

## 2021-12-09 DIAGNOSIS — K58.9 IRRITABLE BOWEL SYNDROME WITHOUT DIARRHEA: ICD-10-CM

## 2021-12-09 DIAGNOSIS — L05.01 PILONIDAL CYST WITH ABSCESS: ICD-10-CM

## 2021-12-09 DIAGNOSIS — F31.4 BIPOLAR DISORDER, CURRENT EPISODE DEPRESSED, SEVERE, WITHOUT PSYCHOTIC FEATURES: ICD-10-CM

## 2022-06-06 NOTE — ED BEHAVIORAL HEALTH ASSESSMENT NOTE - NSBHSUBSTUSED_PSY_A_CORE
[Headaches] : no headaches [Visual Symptoms] : no ~T visual symptoms [Polyuria] : no polyuria [Polydipsia] : no polydipsia [Knee Pain] : no knee pain [Hip Pain] : no hip pain [Constipation] : no constipation [FreeTextEntry2] : I evaluated JOHN in July 2018 for his growth. There was concern about a falling height percentile. He was at the 50th percentile until 5-7 yrs when it began declining. I read his bone age from 7/6/18 as 12 1/2 yrs at [place] 14 yr 1 month. Screening labs were all normal.  At his last visit in Jan 2019, his growth rate was 4.25 cm/yr. On account of the negative work-up and slow growth, I ordered a GH stim test that was done in Feb 2019. His peak GH was 9.6 ng/mL. Having made the diagnosis of GH deficiency, I ordered a MRI of the pituitary. This was done in March 2019 and was normal.  He was started on GH at end of April/beginning of May 2019. I last saw him in Jan 2022 growing at 7.5 cm/yr. I increased his dose to 1.6 mg daily (0.238 mg/kg/week) \par Mother called me about taste issues in April and 2 weeks later as ENT wanted to place him on steroids for 6 days. He taste remains distorted and he doesn't eat meat now. Mother give him protein shakes to get protein into him. \par With respect to symptoms related to his thyroid (given his goiter), he has normal energy level with no fatigue, cold or heat intolerance, or constipation\par 10th grade\par \par  Cannabis

## 2022-09-12 NOTE — ED BEHAVIORAL HEALTH ASSESSMENT NOTE - GAIT / STATION
Normal gait / station Dutasteride Counseling: Dustasteride Counseling:  I discussed with the patient the risks of use of dutasteride including but not limited to decreased libido, decreased ejaculate volume, and gynecomastia. Women who can become pregnant should not handle medication.  All of the patient's questions and concerns were addressed. Dutasteride Male Counseling: Dustasteride Counseling:  I discussed with the patient the risks of use of dutasteride including but not limited to decreased libido, decreased ejaculate volume, and gynecomastia. Women who can become pregnant should not handle medication.  All of the patient's questions and concerns were addressed.

## 2022-12-06 NOTE — PATIENT PROFILE BEHAVIORAL HEALTH - LIVES WITH, PROFILE
parents Cytokeratin Ae1/Ae3 - Negative Histology Text: CK staining demonstrates a normal density and pattern.

## 2023-02-15 NOTE — DISCHARGE NOTE BEHAVIORAL HEALTH - HAS THE PATIENT USED TOBACCO IN THE PAST 30 DAYS?
Rec via cart from ER with 1 unit of PRBC infusing pt and VS appear stable. Denies CP SOB able to ambulated from cart to bed. TELE: SR/ST POC updated with spouse at bedside. Attending MD aware pt on floor paged MD for Protonix holding orders from ER clarification. Rx aware as well awaiting clarification. NPO status maintained in bed resting at time endorsed to 7p RN to f/u with Protonix. Spouse said pt hasnt had any meds for a least two days. Yes

## 2023-04-19 NOTE — DISCHARGE NOTE BEHAVIORAL HEALTH - NS MD DC FALL RISK RISK
For information on Fall & Injury Prevention, visit [email or web address] Alar Island Pedicle Flap Text: The defect edges were debeveled with a #15 scalpel blade.  Given the location of the defect, shape of the defect and the proximity to the alar rim an island pedicle advancement flap was deemed most appropriate.  Using a sterile surgical marker, an appropriate advancement flap was drawn incorporating the defect, outlining the appropriate donor tissue and placing the expected incisions within the nasal ala running parallel to the alar rim. The area thus outlined was incised with a #15 scalpel blade.  The skin margins were undermined minimally to an appropriate distance in all directions around the primary defect and laterally outward around the island pedicle utilizing iris scissors.  There was minimal undermining beneath the pedicle flap.

## 2023-11-17 NOTE — ED PROVIDER NOTE - DISPOSITION TYPE
Have You Had Previous Treatments With Pdt Before?: has not had previous treatments When Outside In The Sun, Do You...: always burns, never tans ADMIT

## 2024-01-30 NOTE — BH PATIENT PROFILE - NSASFUNCLEVELADLTRANSFER_GEN_A_NUR
Patient: Eduarda Nogueira    Procedure: Procedure(s):  ESOPHAGOGASTRODUODENOSCOPY, WITH BIOPSY       Diagnosis: Regurgitation of food [R11.10]  H/O gastric bypass [Z98.84]  Globus sensation [R09.A2]  Diagnosis Additional Information: No value filed.    Anesthesia Type:   MAC     Note:    Oropharynx: oropharynx clear of all foreign objects and spontaneously breathing  Level of Consciousness: drowsy  Oxygen Supplementation: room air    Independent Airway: airway patency satisfactory and stable  Dentition: dentition unchanged  Vital Signs Stable: post-procedure vital signs reviewed and stable  Report to RN Given: handoff report given  Patient transferred to: Phase II    Handoff Report: Identifed the Patient, Identified the Reponsible Provider, Reviewed the pertinent medical history, Discussed the surgical course, Reviewed Intra-OP anesthesia mangement and issues during anesthesia, Set expectations for post-procedure period and Allowed opportunity for questions and acknowledgement of understanding  Vitals:  Vitals Value Taken Time   /85 01/30/24 1211   Temp     Pulse 100 01/30/24 1211   Resp     SpO2 97 % 01/30/24 1213   Vitals shown include unfiled device data.    Electronically Signed By: LUCIA Ward CRNA  January 30, 2024  12:13 PM  
0 = independent

## 2024-03-07 NOTE — BH INPATIENT PSYCHIATRY PROGRESS NOTE - NSBHCHARTREVIEWVS_PSY_A_CORE FT
Vital Signs Last 24 Hrs  T(C): 37 (12-06-21 @ 09:00), Max: 37 (12-06-21 @ 09:00)  T(F): 98.6 (12-06-21 @ 09:00), Max: 98.6 (12-06-21 @ 09:00)  HR: 90 (12-06-21 @ 09:00) (90 - 94)  BP: 116/81 (12-06-21 @ 09:00) (116/81 - 119/67)  BP(mean): --  RR: 16 (12-06-21 @ 09:00) (16 - 18)  SpO2: 99% (12-06-21 @ 09:00) (99% - 99%)    
Detail Level: Detailed
Vital Signs Last 24 Hrs  T(C): 36.5 (12-03-21 @ 09:00), Max: 36.5 (12-03-21 @ 09:00)  T(F): 97.7 (12-03-21 @ 09:00), Max: 97.7 (12-03-21 @ 09:00)  HR: 103 (12-03-21 @ 09:00) (103 - 103)  BP: 117/80 (12-03-21 @ 09:00) (117/80 - 117/80)  BP(mean): --  RR: 18 (12-03-21 @ 09:00) (18 - 18)  SpO2: 100% (12-03-21 @ 09:00) (100% - 100%)    
Vital Signs Last 24 Hrs  T(C): 36.8 (12-04-21 @ 17:39), Max: 37.1 (12-04-21 @ 09:00)  T(F): 98.3 (12-04-21 @ 17:39), Max: 98.7 (12-04-21 @ 09:00)  HR: 98 (12-04-21 @ 17:39) (87 - 98)  BP: 111/72 (12-04-21 @ 17:39) (100/62 - 111/72)  BP(mean): --  RR: 18 (12-04-21 @ 17:39) (16 - 18)  SpO2: 98% (12-04-21 @ 17:39) (98% - 98%)    
Vital Signs Last 24 Hrs  T(C): 36.7 (12-05-21 @ 16:46), Max: 36.8 (12-05-21 @ 10:37)  T(F): 98.1 (12-05-21 @ 16:46), Max: 98.3 (12-05-21 @ 10:37)  HR: 94 (12-05-21 @ 16:46) (84 - 94)  BP: 119/67 (12-05-21 @ 16:46) (113/82 - 119/67)  BP(mean): --  RR: 18 (12-05-21 @ 16:46) (18 - 18)  SpO2: 99% (12-05-21 @ 16:46) (97% - 99%)

## 2024-03-12 ENCOUNTER — EMERGENCY (EMERGENCY)
Facility: HOSPITAL | Age: 29
LOS: 1 days | Discharge: HOME | End: 2024-03-12
Attending: EMERGENCY MEDICINE | Admitting: STUDENT IN AN ORGANIZED HEALTH CARE EDUCATION/TRAINING PROGRAM
Payer: COMMERCIAL

## 2024-03-12 VITALS
DIASTOLIC BLOOD PRESSURE: 60 MMHG | TEMPERATURE: 98 F | SYSTOLIC BLOOD PRESSURE: 108 MMHG | WEIGHT: 212.08 LBS | HEART RATE: 91 BPM | HEIGHT: 67 IN | RESPIRATION RATE: 20 BRPM | OXYGEN SATURATION: 98 %

## 2024-03-12 VITALS — RESPIRATION RATE: 16 BRPM | HEART RATE: 71 BPM | DIASTOLIC BLOOD PRESSURE: 68 MMHG | SYSTOLIC BLOOD PRESSURE: 105 MMHG

## 2024-03-12 PROBLEM — K58.9 IRRITABLE BOWEL SYNDROME WITHOUT DIARRHEA: Chronic | Status: ACTIVE | Noted: 2021-12-02

## 2024-03-12 PROBLEM — L40.9 PSORIASIS, UNSPECIFIED: Chronic | Status: ACTIVE | Noted: 2021-12-02

## 2024-03-12 PROBLEM — L05.91 PILONIDAL CYST WITHOUT ABSCESS: Chronic | Status: ACTIVE | Noted: 2021-12-02

## 2024-03-12 LAB
APPEARANCE UR: CLEAR — SIGNIFICANT CHANGE UP
BASOPHILS # BLD AUTO: 0.06 K/UL — SIGNIFICANT CHANGE UP (ref 0–0.2)
BASOPHILS NFR BLD AUTO: 0.6 % — SIGNIFICANT CHANGE UP (ref 0–2)
BILIRUB UR-MCNC: NEGATIVE — SIGNIFICANT CHANGE UP
COLOR SPEC: YELLOW — SIGNIFICANT CHANGE UP
DIFF PNL FLD: NEGATIVE — SIGNIFICANT CHANGE UP
EOSINOPHIL # BLD AUTO: 0.25 K/UL — SIGNIFICANT CHANGE UP (ref 0–0.5)
EOSINOPHIL NFR BLD AUTO: 2.3 % — SIGNIFICANT CHANGE UP (ref 0–6)
GLUCOSE UR QL: NEGATIVE MG/DL — SIGNIFICANT CHANGE UP
HCG UR QL: NEGATIVE — SIGNIFICANT CHANGE UP
HCT VFR BLD CALC: 44.3 % — SIGNIFICANT CHANGE UP (ref 34.5–45)
HGB BLD-MCNC: 14.5 G/DL — SIGNIFICANT CHANGE UP (ref 11.5–15.5)
IMM GRANULOCYTES NFR BLD AUTO: 0.4 % — SIGNIFICANT CHANGE UP (ref 0–0.9)
KETONES UR-MCNC: ABNORMAL MG/DL
LEUKOCYTE ESTERASE UR-ACNC: NEGATIVE — SIGNIFICANT CHANGE UP
LITHIUM SERPL-MCNC: 0.6 MMOL/L — SIGNIFICANT CHANGE UP (ref 0.6–1.2)
LYMPHOCYTES # BLD AUTO: 2.32 K/UL — SIGNIFICANT CHANGE UP (ref 1–3.3)
LYMPHOCYTES # BLD AUTO: 21.6 % — SIGNIFICANT CHANGE UP (ref 13–44)
MCHC RBC-ENTMCNC: 30 PG — SIGNIFICANT CHANGE UP (ref 27–34)
MCHC RBC-ENTMCNC: 32.7 GM/DL — SIGNIFICANT CHANGE UP (ref 32–36)
MCV RBC AUTO: 91.5 FL — SIGNIFICANT CHANGE UP (ref 80–100)
MONOCYTES # BLD AUTO: 0.52 K/UL — SIGNIFICANT CHANGE UP (ref 0–0.9)
MONOCYTES NFR BLD AUTO: 4.9 % — SIGNIFICANT CHANGE UP (ref 2–14)
NEUTROPHILS # BLD AUTO: 7.53 K/UL — HIGH (ref 1.8–7.4)
NEUTROPHILS NFR BLD AUTO: 70.2 % — SIGNIFICANT CHANGE UP (ref 43–77)
NITRITE UR-MCNC: NEGATIVE — SIGNIFICANT CHANGE UP
NRBC # BLD: 0 /100 WBCS — SIGNIFICANT CHANGE UP (ref 0–0)
PH UR: 7 — SIGNIFICANT CHANGE UP (ref 5–8)
PLATELET # BLD AUTO: 458 K/UL — HIGH (ref 150–400)
PROT UR-MCNC: SIGNIFICANT CHANGE UP MG/DL
RBC # BLD: 4.84 M/UL — SIGNIFICANT CHANGE UP (ref 3.8–5.2)
RBC # FLD: 12.4 % — SIGNIFICANT CHANGE UP (ref 10.3–14.5)
SP GR SPEC: 1.02 — SIGNIFICANT CHANGE UP (ref 1–1.03)
UROBILINOGEN FLD QL: 1 MG/DL — SIGNIFICANT CHANGE UP (ref 0.2–1)
WBC # BLD: 10.72 K/UL — HIGH (ref 3.8–10.5)
WBC # FLD AUTO: 10.72 K/UL — HIGH (ref 3.8–10.5)

## 2024-03-12 PROCEDURE — 80178 ASSAY OF LITHIUM: CPT

## 2024-03-12 PROCEDURE — 81003 URINALYSIS AUTO W/O SCOPE: CPT

## 2024-03-12 PROCEDURE — 99283 EMERGENCY DEPT VISIT LOW MDM: CPT

## 2024-03-12 PROCEDURE — 80053 COMPREHEN METABOLIC PANEL: CPT

## 2024-03-12 PROCEDURE — 99284 EMERGENCY DEPT VISIT MOD MDM: CPT

## 2024-03-12 PROCEDURE — 36415 COLL VENOUS BLD VENIPUNCTURE: CPT

## 2024-03-12 PROCEDURE — 81025 URINE PREGNANCY TEST: CPT

## 2024-03-12 PROCEDURE — 85025 COMPLETE CBC W/AUTO DIFF WBC: CPT

## 2024-03-12 PROCEDURE — 80307 DRUG TEST PRSMV CHEM ANLYZR: CPT

## 2024-03-12 RX ORDER — SODIUM CHLORIDE 9 MG/ML
1000 INJECTION INTRAMUSCULAR; INTRAVENOUS; SUBCUTANEOUS ONCE
Refills: 0 | Status: COMPLETED | OUTPATIENT
Start: 2024-03-12 | End: 2024-03-12

## 2024-03-12 RX ADMIN — SODIUM CHLORIDE 1000 MILLILITER(S): 9 INJECTION INTRAMUSCULAR; INTRAVENOUS; SUBCUTANEOUS at 14:50

## 2024-03-12 NOTE — ED PROVIDER NOTE - OBJECTIVE STATEMENT
Patient states that she has a history of bipolar disease and has been on lithium for a long time.  Recently she got confused with her lithium pills because she has both 150 mg pill and a 300 mg pill and is supposed to be taking 900 mg a day but realized to 3 days ago that she has been taking 1500 mg a day.  She stopped doing this and corrected it to 900 mg but patient states that she still feels "off".  Patient states she is having vision and balance issues and feels like she cannot drive.  Patient denies any SI/HI and states mentally she has been feeling the best she is ever felt.

## 2024-03-12 NOTE — ED ADULT NURSE NOTE - CHIEF COMPLAINT QUOTE
Pharmacy Consultation Note  (Antibiotic Dosing and Monitoring)    Initial consult date: 7/21/22  Consulting physician/provider: Dr Evangelist Blackwell  Drug: Vancomycin  Indication: Bacteremia/UTI/Pneumonia    Age/  Gender Height Weight IBW  Allergy Information   72 y.o./male 5' 11\" (180.3 cm) 150 lb (68 kg)     Ideal body weight: 75.3 kg (166 lb 0.1 oz)   Elemental sulfur and Penicillins      Renal Function:  Recent Labs     07/22/22  0446 07/23/22  0542 07/24/22  0640   BUN 39* 34* 28*   CREATININE 0.9 0.8 0.9       Intake/Output Summary (Last 24 hours) at 7/24/2022 1448  Last data filed at 7/24/2022 0737  Gross per 24 hour   Intake 2985 ml   Output 450 ml   Net 2535 ml       Vancomycin Monitoring:  Trough:    Recent Labs     07/22/22  1141   VANCOTROUGH 20.8*       Random:  No results for input(s): VANCORANDOM in the last 72 hours. Vancomycin Administration Times:  Recent vancomycin administrations                     vancomycin (VANCOCIN) 1,250 mg in dextrose 5 % 250 mL IVPB (mg) 1,250 mg New Bag 07/23/22 0638    vancomycin (VANCOCIN) 750 mg in dextrose 5 % 250 mL IVPB (mg) 750 mg New Bag 07/22/22 0050               Assessment:  Patient is a 81 y.o. male who has been initiated on vancomycin  Estimated Creatinine Clearance: 74 mL/min (based on SCr of 0.9 mg/dL). To dose vancomycin, pharmacy will be utilizing [place] DEPT. calculation software for goal AUC/JANKI 400-600 mg/L-hr  7/22: Trough @ 1141 = 20.8 mcg/mL;   7/23: Morning dose held due to trough level from Friday?  Doses re-timed to continue therapy with today's dose    Plan:  Vancomycin 1250 mg IV every 24 hours  Levels when appropriate  Will continue to monitor renal function   Clinical pharmacy to follow      Katelyn Starr PharmSHANTELLE 7/24/2022 2:48 PM   [phone] patient ambulatory to triage, pleasant, compliant.  she states that she was diagnosed with Bipolar at the age of 15.  Has been on Lithium on / off since the age of 17, consistently for the last 4 years.  Typically takes 900mg per day, and would take 1 -300mg in the am and two 300mg at hs.  I recently got 150mg tablets, and for some reason, and I cannot explain it, I started to take several of the 150mg tablets and 300's at night for a total of 1500mg daily. I did this for about one week before I even realized what I was doing.   I feel fine mentally, denies any SI or HI.  No hallucinations (visually or auditory),  but I don't feel right physically.  I see black dots and I am scared to drive (lives in [place]) im anxious (no history of anxiety).  I smoke weed at night to help relax me but lately it has been making me more anxious. I get sweaty all the time.  I just want to make sure I am ok.   I called my doc and I was told to get evaluated with a lithium level.  she was just going to give me a script for it, but the lab doesn't accept my insurance.   (patient takes (in addition to Lithium) Prozac 20mg AM, Trazadone HS (states she has not been taking since she smokes weed HS) and was just re-prescribed Wellbutrin but has been apprehensive about taking this since she has been feeling "off" physically lately)   she states she is very thirsty as well, "dry mouth".

## 2024-03-12 NOTE — ED ADULT TRIAGE NOTE - CHIEF COMPLAINT QUOTE
patient ambulatory to triage, pleasant, compliant.  she states that she was diagnosed with Bipolar at the age of 15.  Has been on Lithium on / off since the age of 17, consistently for the last 4 years.  Typically takes 900mg per day, and would take 1 300mg in the am and two 300mg at hs.  I recently got 150mg tablets, and for some reason, and I cannot explain it, I started to take several of the 150mg tablets and 300's at night for a total of 1500mg daily. I did this for about one week before I even realized.   I feel fine mentally, denies any SI or HI.  No hallucinations (visually or auditory),  but I don't feel right physically.  I see black dots, im anxious (no history of anxiety).  I smoke weed at night to help relax me but lately it has been making me more anxious. I get sweaty all the time.  I just want to make sure I am ok.   I called my doc and I was told to get evaluated with a lithium level. patient ambulatory to triage, pleasant, compliant.  she states that she was diagnosed with Bipolar at the age of 15.  Has been on Lithium on / off since the age of 17, consistently for the last 4 years.  Typically takes 900mg per day, and would take 1 -300mg in the am and two 300mg at hs.  I recently got 150mg tablets, and for some reason, and I cannot explain it, I started to take several of the 150mg tablets and 300's at night for a total of 1500mg daily. I did this for about one week before I even realized what I was doing.   I feel fine mentally, denies any SI or HI.  No hallucinations (visually or auditory),  but I don't feel right physically.  I see black dots and I am scared to drive (lives in [place]) im anxious (no history of anxiety).  I smoke weed at night to help relax me but lately it has been making me more anxious. I get sweaty all the time.  I just want to make sure I am ok.   I called my doc and I was told to get evaluated with a lithium level.  she was just going to give me a script for it, but the lab doesn't accept my insurance.   (patient takes (in addition to Lithium) Prozac 20mg AM, Trazadone HS (states she has not been taking since she smokes weed HS) and was just re-prescribed Wellbutrin but has been apprehensive about taking this since she has been feeling "off" physically lately) patient ambulatory to triage, pleasant, compliant.  she states that she was diagnosed with Bipolar at the age of 15.  Has been on Lithium on / off since the age of 17, consistently for the last 4 years.  Typically takes 900mg per day, and would take 1 -300mg in the am and two 300mg at hs.  I recently got 150mg tablets, and for some reason, and I cannot explain it, I started to take several of the 150mg tablets and 300's at night for a total of 1500mg daily. I did this for about one week before I even realized what I was doing.   I feel fine mentally, denies any SI or HI.  No hallucinations (visually or auditory),  but I don't feel right physically.  I see black dots and I am scared to drive (lives in [place]) im anxious (no history of anxiety).  I smoke weed at night to help relax me but lately it has been making me more anxious. I get sweaty all the time.  I just want to make sure I am ok.   I called my doc and I was told to get evaluated with a lithium level.  she was just going to give me a script for it, but the lab doesn't accept my insurance.   (patient takes (in addition to Lithium) Prozac 20mg AM, Trazadone HS (states she has not been taking since she smokes weed HS) and was just re-prescribed Wellbutrin but has been apprehensive about taking this since she has been feeling "off" physically lately)   she states she is very thirsty as well, "dry mouth".

## 2024-03-12 NOTE — ED ADULT NURSE NOTE - NSFALLUNIVINTERV_ED_ALL_ED
Bed/Stretcher in lowest position, wheels locked, appropriate side rails in place/Call bell, personal items and telephone in reach/Instruct patient to call for assistance before getting out of bed/chair/stretcher/Non-slip footwear applied when patient is off stretcher/[place] to call system/Physically safe environment - no spills, clutter or unnecessary equipment/Purposeful proactive rounding/Room/bathroom lighting operational, light cord in reach

## 2024-03-12 NOTE — ED PROVIDER NOTE - NSFOLLOWUPINSTRUCTIONS_ED_ALL_ED_FT
1. TAKE ALL MEDICATIONS AS DIRECTED.    2. FOR PAIN OR FEVER YOU CAN TAKE IBUPROFEN (MOTRIN, ADVIL) OR ACETAMINOPHEN (TYLENOL) AS NEEDED, AS DIRECTED ON PACKAGING.  3. FOLLOW UP WITH YOUR PRIMARY DOCTOR WITHIN 5 DAYS AS DIRECTED.  4. IF YOU HAD LABS OR IMAGING DONE, YOU WERE GIVEN COPIES OF ALL LABS AND/OR IMAGING RESULTS FROM YOUR ER VISIT--PLEASE TAKE THEM WITH YOU TO YOUR FOLLOW UP APPOINTMENTS.  5. IF NEEDED, CALL PATIENT ACCESS SERVICES AT [phone] TO FIND A PRIMARY CARE PHYSICIAN.  OR CALL [phone] TO MAKE AN APPOINTMENT WITH THE CLINIC.  6. RETURN TO THE ER FOR ANY WORSENING SYMPTOMS OR CONCERNS.

## 2024-04-10 NOTE — ED PROVIDER NOTE - TEMPLATE
Date of Service: 4/10/2024     YOB: 1999     PREVIOUS PLANS:  Please see last office visit with Dr. Weldon 9/15/21.    CHIEF COMPLAINT:   Chief Complaint   Patient presents with    Consultation     Multiple joint pain and swelling.      Referring Provider  Bennie Reza MD     HISTORY OF PRESENT ILLNESS:   This is a 24 year old patient with the above mentioned problem list who comes in for follow up evaluation.  She is accompanied by her mom, Mayra, today, permission to discuss healthcare obtained.  Patient was last seen by Rheumatology 09/15/2021 with Dr. Weldon, evaluation at that time with concern for mild hypermobility, borderline MARYSE without concern for serious autoimmune disease, felt possibly due to mono infection.  Suspected component of restless legs syndrome and fibromyalgia.  Labs were obtained at that time and patient was to follow up with Rheumatology as needed.  Dr. Weldon has retired.  Visit to pediatrician, Dr. Reza, 07/18/2023 with joint/bone pain, rash, symptoms along with hypermobility with recommendation to again follow up with Rheumatology.  Patient is now established with adult primary care, Faustino Islas MD.    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 Testing for dysautonomia next week with [facility].  Failure to thrive at 11 months.  Has seen GI through the years, scoped, biopsied.  Went gluten free, feels better.  Has diarrhea, constipation, bloating, symptoms returning in the past 2 weeks, no melena, hematochezia.  Concern for skull fracture with fall at age 12.  Stopped volleyball and track due to back pain.  Legs would go numb with lifting arms above head for volleyball.  Stopped sports after Freshman year.  As a child if she was frightened she would go limp.  EKG in December - normal  Did see Cardiology   Cardiac Kim 5/12/21.  Red patches elbows, neck/chest, posterior knees, slightly itchy.  No mouth/nose sores.  Tonsil stones with tonsillectomy 2021. Significant reactive lymphadenopathy axilla, groin, neck after influenza vaccine last Fall, had difficulty even ambulating, would like to avoid future reaction.  Flares occur about every 3 months, lasting 3-6 weeks, rest, heating pads, ibuprofen 200 mg does help.  See musculoskeletal manifestations as below:     Ximena denies any recent headaches, visual changes, or jaw claudication. The patient also denies any new systemic complaints. Ximena is tolerating the current medications well and denies any adverse events or any new systemic rheumatic complaints. The patient denies any headaches, visual changes, chest pain, difficulty breathing, nausea and/or vomiting, fever and/or chills, or changes in bowel and/or urinary habits.     5-Part Questionnaire for Hypermobility  Can you now [or could you ever] place your hands flat on the floor without bending your knees?  Yes  Can you now [or could you ever] bend your thumb to touch your forearm?  Yes  As a child, did you amuse your friends by contorting your body into strange shapes or could you do the splits? Yes  As a child or teenager, did your kneecap or shoulder dislocate on more than one occasion? No  Do you consider yourself “double-jointed”? Yes  Answering yes to 2 or more of these questions suggests hypermobility (sensitivity 85%, specificity 90%)     Patients may present with any combination of the following:     ?Musculoskeletal manifestations - These may include:  Recurrent joint sprains and ligament and tendon injuries - No  Mechanical pain related to biomechanical differences, especially in the lower limbs. Yes  Persistent chronic pain in one or many joints. Wrists are particularly bothersome, cannot put weight on wrists, can have knee, wrist, ankle, and back pain, cervical joint locking  Complex widespread  musculoskeletal pain with evidence of central sensitization and neuropathic qualities. Widespread but not central sensitization.  Episodes of recurrent joint instability, including subluxations (incomplete dislocation) or dislocations. Dislocations often arise from minor trauma. The more commonly affected joints include the carpometacarpal joint at the thumb, and the shoulder, hip, and ankle. The patellar ligament may be so lax as to allow the patella to displace laterally or medially. Less often, some individuals dislocate joints by minor self-manipulation. Notices if she is carrying something heavy the shoulder pops and has to hold arm up.  Poor proprioception, coordination difficulties, and loss of balance.  No   Features of the Marfanoid body habitus (a range of skeletal disproportions associated with increased length and decreased breadth of long bones) No      ?Skin and other tissue manifestations in hypermobile Patrick-Danlos syndrome - These include:  Hyperextensible skin, the texture of which is usually soft and velvety/silky. In children, significant skin transparency would need referral for genetic evaluation. Yes  Easy bruising. - No  Wide, paper-thin scars, not \"atrophic\" like those seen in rarer classical type of Patrick-Danlos syndrome (EDS).  Scars stay white, do not heal.    Multiple stretch marks (striae atrophicae), typically arising during the adolescent growth spurt. - No   Recurrent abdominal wall hernias. - No  Pelvic floor weakness with rectal and/or vaginal prolapse, and bladder dysfunction (dysuria, urgency, frequency, urge, and stress incontinence). - No     ?Other conditions - Other disorders often associated with HSD and hEDS include:  Gastrointestinal and genitourinary (including gynecologic; 50 percent of symptomatic patients):  -Bowel symptoms suggestive of functional gastrointestinal disorders (constipation alternating with diarrhea, bloating, nausea, and pain) and early satiety. -  Yes  -Bowel dysmotility, especially slow-transit constipation. - No  -Heavy and painful menstrual bleeding. - Yes, went on birth control for this for heavy and painful menstrual cycles.    Disabling, persistent fatigue. - Yes  Anxiety, depression, and phobia (eg, fear of movement).  - Yes, also OCD counseling helped  Autonomic dysfunction:  -Palpitations, chest pain, and near-syncope or syncope due to postural tachycardia. - Yes  -Orthostatic symptoms, including (near) blackouts due to postural hypotension. - Yes  -Skin color changes, abnormal sweating. - Yes, Raynaud's of fingers/toes, abnormal sweating    Hyphophosphatasia Workup  Historical Childhood Features:   - History of Rickets: No  - Premature loss of primary teeth with intact roots before age 5 years (may include tooth mobility and poor dentition or loss of secondary teeth): No  - Low-trauma fractures: No   - Ectopic calcifications: No  - Delayed walking, waddling gait, missed motor milestones, and failure to thrive: - Yes, failure to thrive at 11 months.  - Pain (bone, muscle, and/or joint):  Yes, lower back, wrists, neck around age 16.  Legs started as she was older, ages [age].    - Skeletal deformities including shortened or bowed limbs; scoliosis; or enlarged wrists, knees, or ankles: No  - Craniosynostosis: No    Clinical features:  - Dental abnormalities (tooth loss:  Loss of secondary teeth, may also include tooth mobility and poor dentition): No  - fractures (low trauma fractures, delayed healing or recurrent fractures): No   - skeletal deformities (bowing of lower extremities, pathologic alteration of joint axis, malalignment of lower extremities): Yes  - short stature: 5' 5.5\"  - osteomalacia: No  - impaired mobility: Difficulty standing for too long as cement feels like it's filling legs, feels heavy and painful (achy)  - gait disturbance: No   - joint hypermobility: Yes  - muscle weakness: Went to PT, felt her muscle strength has decreased  -  fatigue: Yes   - pain (bone, muscle, and/or joint): Yes  - CPPD (chondrocalcinosis, pseudogout, osteoarthropathy, crystal arthropathy): No  - hypercalcemia/hypercalciuria: - No, but dad, paternal uncles, paternal aunt with kidney stones  - ophthalmic calcifications of cornea and conjunctiva: No, but last eye exam this year, floaters, start of retinal detachment  - normal or slightly elevated phosphate levels: Unknown, lab testing planned    HISTORIES:  I have reviewed the patient's medications and allergies, past medical, surgical, social and family history, updating these as appropriate.  See Histories section of the Electronic Medical Record for a display of this information.    REVIEW OF SYSTEMS:   As per the History of Present Illness.     PHYSICAL EXAMINATION:   Vitals:    04/10/24 1339   BP: 112/60   Pulse: 98   SpO2: 98%   Weight: 57.2 kg (126 lb)   Height: 5' 6\" (1.676 m)   LMP: 01/03/2024   GENERAL: Well dressed and well developed.   HEENT: Normocephalic, atraumatic. Normal appearing sclerae and conjunctivae. Oropharynx clear. Nasal mucosa and lips without ulcerations.   NECK: Supple and nontender. No cervical or supraclavicular lymphadenopathy.   CARDIOVASCULAR: No edema. The temporal arteries were intact and palpable without any tenderness, cords, or other changes.   LUNGS: No wheezing. Breathing is unlabored.   EXTREMITIES: No cyanosis, or clubbing.  Raynaud's changes of the digits, fingers and toes, with decreased capillary refill but no ulcerations or loss of digital pulp.  SKIN: No rashes, digital ulcers, periungual erythema, nail pitting, nodules, or sclerodactyly.   MUSCULOSKELETAL:  Full range of motion of the neck; full range of motion of the bilateral shoulders, bilateral elbows, and bilateral wrists.  Tender to palpation over bilateral wrists.  No nodules, synovitis, subcutaneous nodules, or nail changes.  Full range of motion of the bilateral hips, bilateral knees, and bilateral ankles.  There  are no signs of synovitis, effusions, or Baker's cyst.   SPINE: No tenderness, normal range of motion.   NEUROLOGICAL: Sensation intact. Cranial nerves 2-12 intact.   PSYCHOLOGICAL: Alert and oriented x3. Mood and affect are normal.        Beighton Scoring System:  (A)  With the palm of the hand and forearm resting on a flat surface with the elbow flexed at 90°, if the metacarpal-phalangeal joint of the fifth finger can be hyperextended more than 90° with respect to the dorsum of the hand, it is considered positive, scoring 1 point.  Left - 1      (B)  With arms outstretched forward but hand pronated, if the thumb can be passively moved to touch the ipsilateral forearm it is considered positive scoring 1 point. Yes, Bilateral - 2      (C) With the arms outstretched to the side and hand supine, if the elbow extends more than 10°, it is considered positive scoring 1 point. No      (D) While standing, with knees locked in genu recurvatum, if the knee extends more than 10°, it is considered positive scoring 1 point. No     (E)  With knees locked straight and feet together, if the patient can bend forward to place the total palm of both hands flat on the floor just in front of the feet, it is considered positive scoring 1 point. Yes - 1     Total: 4 out of 9     UpToDate  For the 2017 criteria, a score of ?6 identifies generalized joint hypermobility in children, ?5 in adolescents and younger adults, and ?4 in adults aged 50 years and over.    LABORATORY DATA:   Component      Latest Ref Rn 3/11/2024  9:44 AM   Albumin      3.6 - 5.1 g/dL 3.4 (L)    TOTAL BILIRUBIN      0.2 - 1.0 mg/dL 1.1 (H)    DIRECT BILIRUBIN      0.0 - 0.2 mg/dL 0.2    ALK PHOSPHATASE      45 - 117 Units/L 23 (L)    ALT/SGPT      <64 Units/L 21    AST/SGOT      <=37 Units/L 17    TOTAL PROTEIN      6.4 - 8.2 g/dL 6.8    CORTISOL, AM      5.2 - 22.5 mcg/dL 34.0 (H)       Legend:  (L) Low  (H) High    Component      Latest Ref Rn 12/8/2023  8:54 AM    Sodium      135 - 145 mmol/L 139    Potassium      3.4 - 5.1 mmol/L 4.0    Chloride      97 - 110 mmol/L 102    CO2      21 - 32 mmol/L 26    ANION GAP      7 - 19 mmol/L 15    Glucose      70 - 99 mg/dL 84    BUN      6 - 20 mg/dL 11    Creatinine      0.51 - 0.95 mg/dL 0.69    Glomerular Filtration Rate      >=60  >90    BUN/CREATININE RATIO      7 - 25  16    CALCIUM      8.4 - 10.2 mg/dL 9.0    TOTAL BILIRUBIN      0.2 - 1.0 mg/dL 1.0    AST/SGOT      <=37 Units/L 22    ALT/SGPT      <64 Units/L 25    ALK PHOSPHATASE      45 - 117 Units/L 35 (L)    Albumin      3.6 - 5.1 g/dL 3.7    TOTAL PROTEIN      6.4 - 8.2 g/dL 7.3    TOTAL PROTEIN      6.4 - 8.2 g/dL 7.3    GLOBULIN      2.0 - 4.0 g/dL 3.6    A/G Ratio, Serum      1.0 - 2.4  1.0    Total Globulin      2.1 - 4.2 g/dL 3.1    Albumin      3.5 - 4.9 g/dL 4.2    Alpha 1      0.2 - 0.4 g/dL 0.3    Alpha 2      0.5 - 0.9 g/dL 0.7    Beta      0.7 - 1.2 g/dL 0.8    Gamma      0.7 - 1.7 g/dL 1.3    PATHOLOGY INTERP. Normal electrophoretic pattern. No monoclonal protein is identified.…    Kappa, Free      0.33 - 1.94 mg/dL 1.04    Lambda, Free      0.57 - 2.63 mg/dL 0.91    Kappa/ Lambda Ratio      0.26 - 1.65  1.14    PATHOLOGY INTERP. No monoclonal protein is identified.…    IGG      700 - 1,600 mg/dL 1,510    IGA      70 - 400 mg/dL 80    IGM      40 - 230 mg/dL 45    ALA Dehydratase      nmol/L/sec 6.9    Intestine Isoenzyme SEE NOTE    ALA - REVIEWED BY SEE NOTE    B12      211 - 911 pg/mL 405    FOLATE      >=5.5 ng/mL 7.4    CORTISOL, AM      5.2 - 22.5 mcg/dL 29.7 (H)    Coenzyme Q10, Total      0.4 - 1.6 mg/L 1.0    T4, FREE      0.8 - 1.5 ng/dL 1.2    B. BURGDORFERI AB SCREEN      Negative  Negative    Methylmalonic Acid      0.00 - 0.40 umol/L 0.12    Scanned Report See attached report    Tryptase      <=10.9 ug/L 10.8    TSH      0.350 - 5.000 mcUnits/mL 2.512    Vitamin B1 Whole Blood      70 - 180 nmol/L 87    Vitamin B6 Plasma      20.0 - 125.0  nmol/L 75.1    VITAMIND, 25 HYDROXY      30.0 - 100.0 ng/mL 34.0    TRIIODOTHYRONINE, FREE      2.2 - 4.0 pg/mL 4.2 (H)       Legend:  (L) Low  (H) High    Orders Placed This Encounter    Vitamin B6, Plasma    Magnesium    Phosphorus    Copper, Blood    Ceruloplasmin    Vitamin C Level    Zinc    Sedimentation Rate    C Reactive Protein    Calcium, Ionized    MARYSE Screen With Antibody And IFA Reflex    Anti Nuclear Antibody Comprehensive Panel    Cyclic Citrullinated Peptide Antibody IgG & IgA    Rheumatoid Factor IgA, IgG, IgM    Complement C3 Complement C4    Complement Activity, Total    Thyroid Stimulating Hormone Reflex    Free T3    Free T4    TRANSTHORACIC ECHO(TTE) COMPLETE W/ W/O IMAGING AGENT     Order Specific Question:   How should test results be released to the patient's Mobile Tracing Services portal?     Answer:   Automatic Release [100002]       ASSESSMENT/PLAN:     Both serum alkaline phosphatase, transient arthropathy, multiple sites, hypermobility, dysautonomia, family history of rheumatoid arthritis, family history of Sjogren's disease, positive MARYSE, high serum triiodothyronine  The question is whether any of this patient's pain is due to an inflammatory or autoimmune process.  We do not see any significant signs of inflammatory skin, muscle, or joint disease on examination today. Nevertheless, we think it is worth ruling out any underlying disorder.  In that regard we would like to obtain a sedimentation rate, C-reactive protein, rheumatoid factor, CCP, MARYSE, SHO panel, and complement markers.  In the setting of concern for hypermobility she does not meet Beighton scoring criteria but she does have some hypermobility with concern for cardiac manifestations recommend obtaining an echocardiogram at this time and patient may benefit from seeing Genetics in the future, hold on this referral at the present.  Additionally from a rheumatology perspective there is concern for chronically low alkaline phosphatase in the  setting of failure to thrive at 11 months of age, polyarthralgia as early as 13 years of age, myalgia, probable skull fracture at age 12, joint hypermobility, muscle weakness, and fatigue.  During Neurology workup in December identified elevated vitamin B6 of 75.  Of note, the reference range cutoff of 125 is misleading, should cutoff closer to 50.  There are some earlier alkaline phosphatase levels that are normal however reference range was not adjusted for her age.  Patient has had a number of labs already for separate workup.  To complete this workup for low alkaline phosphatase recommend repeat vitamin B6 level (not presently taking vitamins), magnesium, phosphorus, copper, ceruloplasmin, vitamin-C, zinc, ionized calcium, TSH, free T3, free T4.  Depending on lab results may consider genetic testing for mutation in ALPL gene.  Depending on workup patient is still encouraged to follow-up with Rheumatology despite also likely needing Neurology and Cardiology specialties.     Labs as above.  I reviewed the side effects of all medications prescribed by me as listed in the physician's desk reference and in the package inserts.  Other reasonable and generally accepted treatment options, including the option to do nothing at all, were discussed. The patient was instructed by me to contact our office if the symptoms have not improved, worsened, or if there are any issues/concerns.  The collaborating physician for this visit is Dr. Eva Garcia.  The patient will return to clinic in [place] in about 1 month (around 5/10/2024).    I spent a total of 103 minutes on the day of the visit.  This includes pre-charting, chart review, documenting, and referring/communicating with other health care professionals.

## 2024-08-09 NOTE — ED PROVIDER NOTE - CPE EDP EYES NORM
normal... No. JOELLEN screening performed.  STOP BANG Legend: 0-2 = LOW Risk; 3-4 = INTERMEDIATE Risk; 5-8 = HIGH Risk